# Patient Record
Sex: FEMALE | Race: WHITE | NOT HISPANIC OR LATINO
[De-identification: names, ages, dates, MRNs, and addresses within clinical notes are randomized per-mention and may not be internally consistent; named-entity substitution may affect disease eponyms.]

---

## 2017-02-15 ENCOUNTER — APPOINTMENT (OUTPATIENT)
Dept: ENDOCRINOLOGY | Facility: CLINIC | Age: 62
End: 2017-02-15

## 2019-01-30 ENCOUNTER — APPOINTMENT (OUTPATIENT)
Dept: OTOLARYNGOLOGY | Facility: CLINIC | Age: 64
End: 2019-01-30
Payer: COMMERCIAL

## 2019-01-30 VITALS
DIASTOLIC BLOOD PRESSURE: 84 MMHG | HEIGHT: 62 IN | BODY MASS INDEX: 25.76 KG/M2 | SYSTOLIC BLOOD PRESSURE: 151 MMHG | HEART RATE: 103 BPM | OXYGEN SATURATION: 98 % | WEIGHT: 140 LBS

## 2019-01-30 DIAGNOSIS — Z87.09 PERSONAL HISTORY OF OTHER DISEASES OF THE RESPIRATORY SYSTEM: ICD-10-CM

## 2019-01-30 PROCEDURE — 31231 NASAL ENDOSCOPY DX: CPT

## 2019-01-30 PROCEDURE — 99202 OFFICE O/P NEW SF 15 MIN: CPT | Mod: 25

## 2019-01-30 NOTE — END OF VISIT
[FreeTextEntry3] : I personally discussed this patient with the PA at the time of the visit. I agree with the assessment and plan as written, unless noted below. \par I was present with the PA during the key portions of the history and exam. I agree with the findings and plan as documented in the PA's note, unless noted below\par  [FreeTextEntry2] : I was present during the entire procedure and assisted the PA where needed.\par

## 2019-01-30 NOTE — PROCEDURE
[Topical Lidocaine] : topical lidocaine [Oxymetazoline HCl] : oxymetazoline HCl [de-identified] : Nasal cavity is inflamed and swelling that the scope cannot pass through nasopharynx. Inflamed inferior and middle turbinates. Purulent discharge noted in both nasal cavity.\par

## 2019-01-30 NOTE — HISTORY OF PRESENT ILLNESS
[de-identified] : 64 Y/O F presents at the office for cold-like symptoms for 3 weeks without improvement. Patient reports nasal congestion, cough, sore throat, hoarseness. Patient finished  Augmentin for 10 days but the symptoms persist. She denies headache, fever/chills, purulent discharge, rhinorrhea, epistaxis, muscle ache.

## 2019-01-30 NOTE — ASSESSMENT
[FreeTextEntry1] : 62 Y/O F presents at the office for cold-like symptoms for 3 weeks without improvement. Patient reports nasal congestion, cough, sore throat, hoarseness. Patient finished  Augmentin for 10 days but the symptoms persist. She denies headache, fever/chills, purulent discharge, rhinorrhea, epistaxis, muscle ache.\par \par Possible cold-like symptoms complicated by bacterial sinus infection\par \par - Prescribed Bactrim DS BID for 10 days\par - Prescribed Medrol dose cyrus\par - Recommend nasal saline spray and Afrin spray\par - Recommedn Nexium 20 mg\par - RTC on 2/4/19\par - Advised patient to call the office or go to ED if symptoms get worse\par - Patient verbalized understanding of plans above\par \par

## 2019-02-07 ENCOUNTER — APPOINTMENT (OUTPATIENT)
Dept: OTOLARYNGOLOGY | Facility: HOSPITAL | Age: 64
End: 2019-02-07

## 2019-02-07 ENCOUNTER — TRANSCRIPTION ENCOUNTER (OUTPATIENT)
Age: 64
End: 2019-02-07

## 2019-02-07 VITALS
WEIGHT: 140 LBS | SYSTOLIC BLOOD PRESSURE: 141 MMHG | OXYGEN SATURATION: 98 % | HEIGHT: 62 IN | DIASTOLIC BLOOD PRESSURE: 88 MMHG | BODY MASS INDEX: 25.76 KG/M2 | HEART RATE: 106 BPM

## 2019-11-20 ENCOUNTER — APPOINTMENT (OUTPATIENT)
Dept: OTOLARYNGOLOGY | Facility: CLINIC | Age: 64
End: 2019-11-20
Payer: COMMERCIAL

## 2019-11-20 VITALS
SYSTOLIC BLOOD PRESSURE: 176 MMHG | OXYGEN SATURATION: 98 % | HEART RATE: 85 BPM | DIASTOLIC BLOOD PRESSURE: 94 MMHG | TEMPERATURE: 98.3 F

## 2019-11-20 DIAGNOSIS — R51 HEADACHE: ICD-10-CM

## 2019-11-20 PROCEDURE — 99213 OFFICE O/P EST LOW 20 MIN: CPT | Mod: 25

## 2019-11-20 PROCEDURE — 31231 NASAL ENDOSCOPY DX: CPT

## 2019-11-20 NOTE — REVIEW OF SYSTEMS
[As Noted in HPI] : as noted in HPI [Negative] : Eyes [Nose Bleeds] : no nose bleeds [Nasal Congestion] : no nasal congestion [Discolored Nasal Discharge] : no discolored nasal discharge [FreeTextEntry4] : right temporal headache

## 2019-11-20 NOTE — HISTORY OF PRESENT ILLNESS
[de-identified] : 64F w/ PMH of sinusitis treated with antibiotics, presenting now with un-relenting right sided headache for 5 days. Patient reports that Saturday (5 days ago) she developed right temporal headache that is slightly improved with Tylenol and ibuprofen and mild to moderate in nature, but persistent since Saturday. She denies any fevers, chills, vision changes, weakness, nasal discharge, nasal congestion.

## 2019-11-20 NOTE — ASSESSMENT
[FreeTextEntry1] : 64F w/ PMH of sinusitis, who presents with temporal headaches; likely not sinusitis as PE is not consistent with sinusitis, r/o other sources.\par \par Plan:\par - patient planning to f/u with her PCP for this tomorrow\par - recommended MRI O/F/N and imaging of brain to look for other causes of headache (i.e. temporal arteritis)\par - no need for abx at this time\par - f/u after MRI

## 2019-11-20 NOTE — PHYSICAL EXAM
[Normal] : external appearance is normal [] : septum deviated to the right [Nasal Endoscopy Performed] : nasal endoscopy was performed, see procedure section for findings [de-identified] : septal spur no right and turbinates with erythema, but no obstruction or purulent sinus drainage

## 2019-12-03 ENCOUNTER — OTHER (OUTPATIENT)
Age: 64
End: 2019-12-03

## 2019-12-03 ENCOUNTER — TRANSCRIPTION ENCOUNTER (OUTPATIENT)
Age: 64
End: 2019-12-03

## 2019-12-04 ENCOUNTER — OTHER (OUTPATIENT)
Age: 64
End: 2019-12-04

## 2020-09-09 ENCOUNTER — NON-APPOINTMENT (OUTPATIENT)
Age: 65
End: 2020-09-09

## 2020-09-09 ENCOUNTER — APPOINTMENT (OUTPATIENT)
Dept: HEART AND VASCULAR | Facility: CLINIC | Age: 65
End: 2020-09-09
Payer: COMMERCIAL

## 2020-09-09 VITALS
TEMPERATURE: 98.4 F | DIASTOLIC BLOOD PRESSURE: 95 MMHG | RESPIRATION RATE: 16 BRPM | HEIGHT: 62.5 IN | OXYGEN SATURATION: 100 % | HEART RATE: 95 BPM | BODY MASS INDEX: 26.02 KG/M2 | SYSTOLIC BLOOD PRESSURE: 158 MMHG | WEIGHT: 145 LBS

## 2020-09-09 DIAGNOSIS — R07.9 CHEST PAIN, UNSPECIFIED: ICD-10-CM

## 2020-09-09 PROCEDURE — 93000 ELECTROCARDIOGRAM COMPLETE: CPT

## 2020-09-09 PROCEDURE — 99204 OFFICE O/P NEW MOD 45 MIN: CPT

## 2020-09-09 RX ORDER — ESOMEPRAZOLE MAGNESIUM 20 MG/1
20 CAPSULE, DELAYED RELEASE ORAL DAILY
Qty: 1 | Refills: 0 | Status: DISCONTINUED | COMMUNITY
Start: 2019-01-30 | End: 2020-09-09

## 2020-09-09 NOTE — HISTORY OF PRESENT ILLNESS
[FreeTextEntry1] : 64 yo F with PMH of HTN, pre-DM, HLD, anxiety, h/o spinal fusion surgery, thyroid nodules s/p FNA w/o evidence of malignancy, presents for evaluation of cardiovascular risk.\par \par Recent labwork from 02/2019 showed:\par cholesterol 202\par LDL 97\par HDL 64

## 2020-09-15 PROBLEM — R07.9 CHEST PAIN: Status: ACTIVE | Noted: 2020-09-15

## 2020-11-21 ENCOUNTER — OUTPATIENT (OUTPATIENT)
Dept: OUTPATIENT SERVICES | Facility: HOSPITAL | Age: 65
LOS: 1 days | End: 2020-11-21

## 2020-12-21 PROBLEM — Z87.09 HISTORY OF ACUTE SINUSITIS: Status: RESOLVED | Noted: 2019-01-30 | Resolved: 2020-12-21

## 2020-12-22 ENCOUNTER — APPOINTMENT (OUTPATIENT)
Dept: CARDIOLOGY | Facility: CLINIC | Age: 65
End: 2020-12-22
Payer: COMMERCIAL

## 2020-12-22 ENCOUNTER — NON-APPOINTMENT (OUTPATIENT)
Age: 65
End: 2020-12-22

## 2020-12-22 VITALS
HEIGHT: 63.5 IN | OXYGEN SATURATION: 98 % | SYSTOLIC BLOOD PRESSURE: 150 MMHG | DIASTOLIC BLOOD PRESSURE: 96 MMHG | WEIGHT: 146 LBS | HEART RATE: 114 BPM | BODY MASS INDEX: 25.55 KG/M2

## 2020-12-22 DIAGNOSIS — Z00.00 ENCOUNTER FOR GENERAL ADULT MEDICAL EXAMINATION W/OUT ABNORMAL FINDINGS: ICD-10-CM

## 2020-12-22 PROCEDURE — 93000 ELECTROCARDIOGRAM COMPLETE: CPT

## 2020-12-22 PROCEDURE — 99214 OFFICE O/P EST MOD 30 MIN: CPT

## 2020-12-22 PROCEDURE — 99072 ADDL SUPL MATRL&STAF TM PHE: CPT

## 2020-12-22 RX ORDER — TRIAMCINOLONE ACETONIDE 55 UG/1
55 SPRAY, METERED NASAL
Qty: 1 | Refills: 2 | Status: DISCONTINUED | COMMUNITY
Start: 2019-11-20 | End: 2020-12-22

## 2020-12-22 RX ORDER — AMLODIPINE BESYLATE 2.5 MG/1
2.5 TABLET ORAL DAILY
Qty: 90 | Refills: 0 | Status: DISCONTINUED | COMMUNITY
Start: 2020-12-22 | End: 2020-12-22

## 2020-12-22 RX ORDER — SULFAMETHOXAZOLE AND TRIMETHOPRIM 800; 160 MG/1; MG/1
800-160 TABLET ORAL TWICE DAILY
Qty: 20 | Refills: 0 | Status: DISCONTINUED | COMMUNITY
Start: 2019-01-30 | End: 2020-12-22

## 2020-12-22 RX ORDER — METHYLPREDNISOLONE 4 MG/1
4 TABLET ORAL
Qty: 1 | Refills: 0 | Status: DISCONTINUED | COMMUNITY
Start: 2019-01-30 | End: 2020-12-22

## 2020-12-22 NOTE — DISCUSSION/SUMMARY
[FreeTextEntry1] : 1) D/c Norvasc\par 2) Toprol 25mg QD, 50mg if necessary\par 3) F/u in 3 months, sooner if needed

## 2020-12-22 NOTE — PHYSICAL EXAM
[General Appearance - Well Developed] : well developed [Normal Appearance] : normal appearance [Well Groomed] : well groomed [General Appearance - Well Nourished] : well nourished [No Deformities] : no deformities [General Appearance - In No Acute Distress] : no acute distress [Normal Conjunctiva] : the conjunctiva exhibited no abnormalities [Eyelids - No Xanthelasma] : the eyelids demonstrated no xanthelasmas [Normal Oral Mucosa] : normal oral mucosa [No Oral Pallor] : no oral pallor [No Oral Cyanosis] : no oral cyanosis [Normal Jugular Venous A Waves Present] : normal jugular venous A waves present [Normal Jugular Venous V Waves Present] : normal jugular venous V waves present [No Jugular Venous Chase A Waves] : no jugular venous chase A waves [Heart Rate And Rhythm] : heart rate and rhythm were normal [Heart Sounds] : normal S1 and S2 [Murmurs] : no murmurs present [Respiration, Rhythm And Depth] : normal respiratory rhythm and effort [Exaggerated Use Of Accessory Muscles For Inspiration] : no accessory muscle use [Auscultation Breath Sounds / Voice Sounds] : lungs were clear to auscultation bilaterally [Abdomen Soft] : soft [Abdomen Tenderness] : non-tender [Abdomen Mass (___ Cm)] : no abdominal mass palpated [Abnormal Walk] : normal gait [Gait - Sufficient For Exercise Testing] : the gait was sufficient for exercise testing [Nail Clubbing] : no clubbing of the fingernails [Cyanosis, Localized] : no localized cyanosis [Petechial Hemorrhages (___cm)] : no petechial hemorrhages [Skin Color & Pigmentation] : normal skin color and pigmentation [] : no rash [No Venous Stasis] : no venous stasis [Skin Lesions] : no skin lesions [No Skin Ulcers] : no skin ulcer [No Xanthoma] : no  xanthoma was observed [Oriented To Time, Place, And Person] : oriented to person, place, and time [Affect] : the affect was normal [Mood] : the mood was normal [No Anxiety] : not feeling anxious

## 2020-12-22 NOTE — REASON FOR VISIT
[Consultation] : a consultation regarding [Hypertension] : hypertension [FreeTextEntry1] : I saw this 65-year-old woman in cardiac consultation on 12/22/20\par She is under  stress with her  undergoing treatment for cancer\par Other than hypertension she has no other risk factors for coronary artery disease.  Her lipid profile is reasonable.\par She uses Xanax as needed\par She denies chest pain, shortness of breath, or syncope.

## 2021-02-11 ENCOUNTER — APPOINTMENT (OUTPATIENT)
Dept: CARDIOLOGY | Facility: CLINIC | Age: 66
End: 2021-02-11

## 2021-05-07 ENCOUNTER — APPOINTMENT (OUTPATIENT)
Dept: OPHTHALMOLOGY | Facility: CLINIC | Age: 66
End: 2021-05-07
Payer: COMMERCIAL

## 2021-05-07 ENCOUNTER — NON-APPOINTMENT (OUTPATIENT)
Age: 66
End: 2021-05-07

## 2021-05-07 PROCEDURE — 92002 INTRM OPH EXAM NEW PATIENT: CPT

## 2021-05-07 PROCEDURE — 92134 CPTRZ OPH DX IMG PST SGM RTA: CPT

## 2021-05-07 PROCEDURE — 99072 ADDL SUPL MATRL&STAF TM PHE: CPT

## 2021-05-07 PROCEDURE — 92136 OPHTHALMIC BIOMETRY: CPT

## 2021-11-30 ENCOUNTER — APPOINTMENT (OUTPATIENT)
Dept: OPHTHALMOLOGY | Facility: CLINIC | Age: 66
End: 2021-11-30

## 2022-02-14 RX ORDER — METOPROLOL SUCCINATE 25 MG/1
25 TABLET, EXTENDED RELEASE ORAL
Qty: 180 | Refills: 0 | Status: ACTIVE | COMMUNITY
Start: 2020-12-22 | End: 1900-01-01

## 2022-02-24 ENCOUNTER — APPOINTMENT (OUTPATIENT)
Dept: OPHTHALMOLOGY | Facility: CLINIC | Age: 67
End: 2022-02-24
Payer: COMMERCIAL

## 2022-02-24 ENCOUNTER — NON-APPOINTMENT (OUTPATIENT)
Age: 67
End: 2022-02-24

## 2022-02-24 PROCEDURE — 92012 INTRM OPH EXAM EST PATIENT: CPT

## 2022-05-16 ENCOUNTER — RX RENEWAL (OUTPATIENT)
Age: 67
End: 2022-05-16

## 2022-05-18 ENCOUNTER — APPOINTMENT (OUTPATIENT)
Dept: OPHTHALMOLOGY | Facility: AMBULATORY SURGERY CENTER | Age: 67
End: 2022-05-18

## 2022-08-02 ENCOUNTER — EMERGENCY (EMERGENCY)
Facility: HOSPITAL | Age: 67
LOS: 1 days | Discharge: HOME | End: 2022-08-02
Admitting: EMERGENCY MEDICINE
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
## 2022-08-02 DIAGNOSIS — Z90.89 ACQUIRED ABSENCE OF OTHER ORGANS: Chronic | ICD-10-CM
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
## 2022-08-02 DIAGNOSIS — S61.210A LACERATION WITHOUT FOREIGN BODY OF RIGHT INDEX FINGER WITHOUT DAMAGE TO NAIL, INITIAL ENCOUNTER: ICD-10-CM

## 2022-08-02 DIAGNOSIS — Z98.890 OTHER SPECIFIED POSTPROCEDURAL STATES: Chronic | ICD-10-CM

## 2022-08-02 PROBLEM — I10 ESSENTIAL (PRIMARY) HYPERTENSION: Chronic | Status: ACTIVE | Noted: 2022-05-17

## 2022-08-02 PROCEDURE — 99283 EMERGENCY DEPT VISIT LOW MDM: CPT | Mod: 25

## 2022-08-02 PROCEDURE — 73130 X-RAY EXAM OF HAND: CPT | Mod: 26,RT

## 2022-08-02 PROCEDURE — 12001 RPR S/N/AX/GEN/TRNK 2.5CM/<: CPT

## 2022-09-28 ENCOUNTER — APPOINTMENT (OUTPATIENT)
Dept: PULMONOLOGY | Facility: CLINIC | Age: 67
End: 2022-09-28

## 2022-09-28 VITALS
SYSTOLIC BLOOD PRESSURE: 183 MMHG | HEART RATE: 80 BPM | OXYGEN SATURATION: 99 % | DIASTOLIC BLOOD PRESSURE: 100 MMHG | HEIGHT: 63.5 IN | TEMPERATURE: 97.3 F | RESPIRATION RATE: 12 BRPM | WEIGHT: 137 LBS | BODY MASS INDEX: 23.97 KG/M2

## 2022-09-28 VITALS — SYSTOLIC BLOOD PRESSURE: 162 MMHG | DIASTOLIC BLOOD PRESSURE: 78 MMHG

## 2022-09-28 DIAGNOSIS — K21.9 GASTRO-ESOPHAGEAL REFLUX DISEASE W/OUT ESOPHAGITIS: ICD-10-CM

## 2022-09-28 DIAGNOSIS — R05.9 COUGH, UNSPECIFIED: ICD-10-CM

## 2022-09-28 DIAGNOSIS — J30.9 ALLERGIC RHINITIS, UNSPECIFIED: ICD-10-CM

## 2022-09-28 PROCEDURE — 99204 OFFICE O/P NEW MOD 45 MIN: CPT

## 2022-09-28 NOTE — DISCUSSION/SUMMARY
[FreeTextEntry1] : 67 year old female, former smoker (quit smoking 40 years back) came to clinic for dry night time cough for last 6 weeks. She is c/o nasal congestion and symptoms of GERD. She used omeprazole for GERD but felt tired and d/c it. Plan to start her on azelastine and Flonase nasal spray. Also to add famotidine 40 mg bid (20 mg bid by ineffective in the past).

## 2022-09-28 NOTE — HISTORY OF PRESENT ILLNESS
[Former] : former [TextBox_4] : 67 year old female, former smoker (quit smoking 40 years back) came to clinic for cough for last 6 weeks. Patient continued to have dry cough, which is persistent without any improvement. Cough is present mostly at night time, predominantly dry, associated with nasal congestion, constant throat clearing and GERD symptoms. She denies fever, chest pain or sore throat. No exposure at home, work identified. No pets a home. No h/o previous episodes of cough.\par  [ESS] : 2

## 2022-09-28 NOTE — REVIEW OF SYSTEMS
[Fever] : no fever [Chills] : no chills [Nasal Congestion] : nasal congestion [Postnasal Drip] : postnasal drip [Cough] : cough [Sputum] : no sputum [Dyspnea] : no dyspnea [SOB on Exertion] : no sob on exertion [Chest Discomfort] : no chest discomfort [Orthopnea] : no orthopnea [Nasal Discharge] : nasal discharge [GERD] : gerd [Nocturia] : no nocturia [Irregular Menses] : no irregular menses [Raynaud] : no raynaud [Telangiectasias] : no telangiectasias [Anemia] : no anemia [History of Iron Deficiency] : no history of iron deficiency [Headache] : no headache [Dizziness] : no dizziness

## 2022-09-28 NOTE — PHYSICAL EXAM
[No Acute Distress] : no acute distress [Well Nourished] : well nourished [Normal Oropharynx] : normal oropharynx [II] : Mallampati Class: II [Normal Appearance] : normal appearance [Normal Rate/Rhythm] : normal rate/rhythm [Normal S1, S2] : normal s1, s2 [No Resp Distress] : no resp distress [Clear to Auscultation Bilaterally] : clear to auscultation bilaterally [Benign] : benign [Not Tender] : not tender [Normal Gait] : normal gait [No Clubbing] : no clubbing [No Edema] : no edema [Normal Color/ Pigmentation] : normal color/ pigmentation [No Rash] : no rash [No Focal Deficits] : no focal deficits [No Sensory Deficits] : no sensory deficits [Oriented x3] : oriented x3 [Normal Mood] : normal mood

## 2022-10-03 RX ORDER — AZELASTINE HYDROCHLORIDE 137 UG/1
137 SPRAY, METERED NASAL
Qty: 1 | Refills: 5 | Status: ACTIVE | COMMUNITY
Start: 2022-09-28 | End: 1900-01-01

## 2022-10-03 RX ORDER — FLUTICASONE PROPIONATE 50 UG/1
50 SPRAY, METERED NASAL TWICE DAILY
Qty: 1 | Refills: 5 | Status: ACTIVE | COMMUNITY
Start: 2022-09-28 | End: 1900-01-01

## 2022-10-14 NOTE — ASU PATIENT PROFILE, ADULT - FALL HARM RISK - UNIVERSAL INTERVENTIONS
Bed in lowest position, wheels locked, appropriate side rails in place/Call bell, personal items and telephone in reach/Instruct patient to call for assistance before getting out of bed or chair/Non-slip footwear when patient is out of bed/[place] to call system/Physically safe environment - no spills, clutter or unnecessary equipment/Purposeful Proactive Rounding/Room/bathroom lighting operational, light cord in reach

## 2022-10-17 ENCOUNTER — OUTPATIENT (OUTPATIENT)
Dept: OUTPATIENT SERVICES | Facility: HOSPITAL | Age: 67
LOS: 1 days | Discharge: HOME | End: 2022-10-17

## 2022-10-17 ENCOUNTER — TRANSCRIPTION ENCOUNTER (OUTPATIENT)
Age: 67
End: 2022-10-17

## 2022-10-17 VITALS
HEART RATE: 70 BPM | OXYGEN SATURATION: 99 % | SYSTOLIC BLOOD PRESSURE: 112 MMHG | RESPIRATION RATE: 16 BRPM | TEMPERATURE: 98 F | DIASTOLIC BLOOD PRESSURE: 68 MMHG

## 2022-10-17 VITALS
DIASTOLIC BLOOD PRESSURE: 70 MMHG | RESPIRATION RATE: 14 BRPM | WEIGHT: 138.23 LBS | SYSTOLIC BLOOD PRESSURE: 114 MMHG | HEART RATE: 69 BPM | TEMPERATURE: 98 F | HEIGHT: 63 IN | OXYGEN SATURATION: 100 %

## 2022-10-17 DIAGNOSIS — Z90.89 ACQUIRED ABSENCE OF OTHER ORGANS: Chronic | ICD-10-CM

## 2022-10-17 DIAGNOSIS — Z98.890 OTHER SPECIFIED POSTPROCEDURAL STATES: Chronic | ICD-10-CM

## 2022-10-17 DEVICE — LENS IOL TECNIS PROTEC ZCB00 23.0D
Type: IMPLANTABLE DEVICE | Site: RIGHT | Status: NON-FUNCTIONAL
Removed: 2022-10-17

## 2022-10-17 RX ORDER — SODIUM CHLORIDE 9 MG/ML
1000 INJECTION, SOLUTION INTRAVENOUS
Refills: 0 | Status: DISCONTINUED | OUTPATIENT
Start: 2022-10-17 | End: 2022-10-17

## 2022-10-17 RX ORDER — PHENYLEPHRINE HCL 2.5 %
1 DROPS OPHTHALMIC (EYE)
Refills: 0 | Status: COMPLETED | OUTPATIENT
Start: 2022-10-17 | End: 2022-10-17

## 2022-10-17 RX ORDER — TROPICAMIDE 1 %
1 DROPS OPHTHALMIC (EYE)
Refills: 0 | Status: COMPLETED | OUTPATIENT
Start: 2022-10-17 | End: 2022-10-17

## 2022-10-17 RX ORDER — ONDANSETRON 8 MG/1
4 TABLET, FILM COATED ORAL EVERY 4 HOURS
Refills: 0 | Status: DISCONTINUED | OUTPATIENT
Start: 2022-10-17 | End: 2022-10-17

## 2022-10-17 RX ORDER — ACETAMINOPHEN 500 MG
650 TABLET ORAL EVERY 6 HOURS
Refills: 0 | Status: DISCONTINUED | OUTPATIENT
Start: 2022-10-17 | End: 2022-10-17

## 2022-10-17 RX ORDER — KETOROLAC TROMETHAMINE 30 MG/ML
15 SYRINGE (ML) INJECTION ONCE
Refills: 0 | Status: DISCONTINUED | OUTPATIENT
Start: 2022-10-17 | End: 2022-10-17

## 2022-10-17 RX ORDER — OXYCODONE HYDROCHLORIDE 5 MG/1
5 TABLET ORAL ONCE
Refills: 0 | Status: DISCONTINUED | OUTPATIENT
Start: 2022-10-17 | End: 2022-10-17

## 2022-10-17 RX ORDER — KETOROLAC TROMETHAMINE 0.5 %
1 DROPS OPHTHALMIC (EYE)
Refills: 0 | Status: COMPLETED | OUTPATIENT
Start: 2022-10-17 | End: 2022-10-17

## 2022-10-17 RX ADMIN — Medication 1 DROP(S): at 08:12

## 2022-10-17 RX ADMIN — Medication 1 DROP(S): at 08:07

## 2022-10-17 RX ADMIN — Medication 1 DROP(S): at 08:17

## 2022-10-17 NOTE — OPERATIVE REPORT - OPERATIVE RPOSRT DETAILS
DATE OF SURGERY: 10-    SURGEON NAME:  LISSETTE KERR MD    ANESTHESIA: MAC    OPERATION: Cataract Extraction with Femtosecond Laser & IOL Implantation    INTRAOCULAR LENS:  CZB00 23.0    PREOPERATIVE DIAGNOSIS: Cataract & Astigmatism    POSTOPERATIVE DIAGNOSIS: Same    COMPLICATIONS:	None    ESTIMATED BLOOD LOSS: <1 cc	    PROCEDURE:    The patient was brought to the Femtosecond laser suite at [facility] operating room.  The patient’s right eye was anesthetized with Tetracaine drops.  A Methylene blue marking pen was used to make alignment marks at the limbus at the 3 and 9 o’clock positions.  Next, programming of the laser was reviewed including the program for creation of corneal relaxing incisions for the correction of congenital astigmatism, nuclear lens softening, and capsulotomy.  The patient was then placed in a supine position.  The suction ring for the laser was placed onto the eye.   The fiducial marks on the suction ring were aligned with the Methylene blue marks that had been previously placed on the cornea.  Suction was applied and the suction ring was docked to the laser.   OCT scanning took place and all relevant ocular surfaces were identified.  Next, the pre-programmed treatments for the creation of the corneal relaxing incisions, nuclear lens softening, and capsulotomy were performed.   All pre-programmed treatments were successfully completed.   Next, the suction ring was removed.   The patient was taken to the main operating room.    The right  eye was prepped and draped in the usual fashion.  A wire lid speculum was inserted.  Additional numbing took place with the installation of more Tetracaine.  A Meño scissor was used to open the conjunctiva and Tenon’s in the inferotemporal quadrant.  Next, an anesthetic mixture consisting of 50% 2% Lidocaine, 50% 0.75% Marcaine, and an ampule of Wydase was injected using a BSS cannula.  A total of 3 cc was injected below Tenon’s capsule.  Next, a superior rectus traction suture was placed.  A fornix based conjunctival flap was raised using a Meño scissor.  Hemostasis was achieved using a wet field cautery.   Next, a 69 [place] blade was used to make a vertical incision of about 50% depth, 3 mm length, at 0.5 mm posterior to the limbus.  A Yale blade was then used to dissect a scleral tunnel to the arcades.  Next, a 15-degree superblade was used to create incisions at 10 and 2 o’clock positions.      Following this, a 2.75 mm keratome was used to open the main wound.   Next, MPF Lidocaine was injected into the anterior chamber followed by Epinephrine.  Next, Vision blue was injected into the anterior chamber and irrigated out after a period of 20 seconds.  The anterior chamber was then filled with Amvisc plus.  Next, a capsulorexis forcep was used to remove the anterior lens capsule.  Hydro dissection was then performed.  The lens nucleus was prolapsed into the anterior chamber.  The nucleus was then removed with the phaco tip of the GERSON Signature machine.  The cortex was removed with the Transformer IA tip.  The posterior capsule was polished with a Ya scratcher and the sub-incisional cortex was removed using a bimanual technique employing a Simcoe cannula.      The eye was then filled with Healon.  A posterior chamber intraocular lens was inserted into the capsular bag. The Healon was removed using the Transformer IA tip.  Miochol was injected to constrict the pupil.  The wound was closed superiorly using two interrupted 10-O nylon sutures with the knots buried into the cornea.  The side port incisions were infiltrated with BSS on an air cannula.  Next, injections of Ancef and Solumedrol were delivered sub-conjunctivally.  The traction suture was then cut and removed.  The speculum was removed.   Pilocarpine drops were then instilled into the eye.  The lid was closed.  Ilotycin ointment was applied to the lids.  A patch and shield were then applied.  The patient was returned to post-op holding area in good condition having tolerated the procedure well.

## 2022-10-17 NOTE — ASU DISCHARGE PLAN (ADULT/PEDIATRIC) - NS MD DC FALL RISK RISK
For information on Fall & Injury Prevention, visit: [email or web address] OR  [email or web address] OR  https://www.cdc.gov/steadi/patient.html

## 2022-10-25 RX ORDER — KETOROLAC TROMETHAMINE 0.5 %
1 DROPS OPHTHALMIC (EYE)
Refills: 0 | Status: DISCONTINUED | OUTPATIENT
Start: 2022-10-31 | End: 2022-10-31

## 2022-10-25 RX ORDER — TROPICAMIDE 1 %
1 DROPS OPHTHALMIC (EYE)
Refills: 0 | Status: DISCONTINUED | OUTPATIENT
Start: 2022-10-31 | End: 2022-10-31

## 2022-10-25 RX ORDER — PHENYLEPHRINE HCL 2.5 %
1 DROPS OPHTHALMIC (EYE)
Refills: 0 | Status: DISCONTINUED | OUTPATIENT
Start: 2022-10-31 | End: 2022-10-31

## 2022-10-25 RX ORDER — SODIUM CHLORIDE 9 MG/ML
1000 INJECTION, SOLUTION INTRAVENOUS
Refills: 0 | Status: DISCONTINUED | OUTPATIENT
Start: 2022-10-31 | End: 2022-10-31

## 2022-10-28 NOTE — ASU PATIENT PROFILE, ADULT - NSICDXPASTSURGICALHX_GEN_ALL_CORE_FT
PAST SURGICAL HISTORY:  S/P lumbar spine operation     Status post extracapsular cataract extraction of right eye     Status post tonsillectomy

## 2022-10-31 ENCOUNTER — TRANSCRIPTION ENCOUNTER (OUTPATIENT)
Age: 67
End: 2022-10-31

## 2022-10-31 ENCOUNTER — OUTPATIENT (OUTPATIENT)
Dept: OUTPATIENT SERVICES | Facility: HOSPITAL | Age: 67
LOS: 1 days | Discharge: HOME | End: 2022-10-31

## 2022-10-31 VITALS
RESPIRATION RATE: 16 BRPM | OXYGEN SATURATION: 99 % | WEIGHT: 137.79 LBS | SYSTOLIC BLOOD PRESSURE: 132 MMHG | TEMPERATURE: 98 F | HEIGHT: 63 IN | HEART RATE: 73 BPM | DIASTOLIC BLOOD PRESSURE: 90 MMHG

## 2022-10-31 VITALS
SYSTOLIC BLOOD PRESSURE: 137 MMHG | OXYGEN SATURATION: 98 % | TEMPERATURE: 98 F | HEART RATE: 60 BPM | DIASTOLIC BLOOD PRESSURE: 75 MMHG | RESPIRATION RATE: 15 BRPM

## 2022-10-31 DIAGNOSIS — Z90.89 ACQUIRED ABSENCE OF OTHER ORGANS: Chronic | ICD-10-CM

## 2022-10-31 DIAGNOSIS — Z98.890 OTHER SPECIFIED POSTPROCEDURAL STATES: Chronic | ICD-10-CM

## 2022-10-31 DIAGNOSIS — Z98.41 CATARACT EXTRACTION STATUS, RIGHT EYE: Chronic | ICD-10-CM

## 2022-10-31 DEVICE — LENS IOL TECNIS PROTEC ZCB00 22.0D
Type: IMPLANTABLE DEVICE | Site: LEFT (LEFT EYE) | Status: NON-FUNCTIONAL
Removed: 2022-10-31

## 2022-10-31 NOTE — OPERATIVE REPORT - OPERATIVE RPOSRT DETAILS
DATE OF SURGERY:10-    SURGEON NAME:  LISSETTE KERR MD    ANESTHESIA: MAC    OPERATION: Cataract Extraction with Femtosecond Laser & IOL Implantation    INTRAOCULAR LENS: CZB00 22.0D    PREOPERATIVE DIAGNOSIS: Cataract & Astigmatism left eye    POSTOPERATIVE DIAGNOSIS: Same    COMPLICATIONS:	None    ESTIMATED BLOOD LOSS: <1 cc	    PROCEDURE:    The patient was brought to the Femtosecond laser suite at [facility] operating room.  The patient’s left eye was anesthetized with Tetracaine drops.  A Methylene blue marking pen was used to make alignment marks at the limbus at the 3 and 9 o’clock positions.  Next, programming of the laser was reviewed including the program for creation of corneal relaxing incisions for the correction of congenital astigmatism, nuclear lens softening, and capsulotomy.  The patient was then placed in a supine position.  The suction ring for the laser was placed onto the eye.   The fiducial marks on the suction ring were aligned with the Methylene blue marks that had been previously placed on the cornea.  Suction was applied and the suction ring was docked to the laser.   OCT scanning took place and all relevant ocular surfaces were identified.  Next, the pre-programmed treatments for the creation of the corneal relaxing incisions, nuclear lens softening, and capsulotomy were performed.   All pre-programmed treatments were successfully completed.   Next, the suction ring was removed.   The patient was taken to the main operating room.    The left eye was prepped and draped in the usual fashion.  A wire lid speculum was inserted.  Additional numbing took place with the installation of more Tetracaine.   Next, a superior rectus traction suture was placed.  A fornix based conjunctival flap was raised using a Meño scissor.  Hemostasis was achieved using a wet field cautery.   Next, a 69 [place] blade was used to make a vertical incision of about 50% depth, 3 mm length, at 0.5 mm posterior to the limbus.  A [place] blade was then used to dissect a scleral tunnel to the arcades.  Next, a 15-degree superblade was used to create incisions at 10 and 2 o’clock positions.      Following this, a 2.75 mm keratome was used to open the main wound.   Next, MPF Lidocaine was injected into the anterior chamber followed by Epinephrine.  Next, Vision blue was injected into the anterior chamber and irrigated out after a period of 20 seconds.  The anterior chamber was then filled with Amvisc plus.  Next, a capsulorexis forcep was used to remove the anterior lens capsule.  Hydro dissection was then performed.  The lens nucleus was prolapsed into the anterior chamber.  The nucleus was then removed with the phaco tip of the GERSON Signature machine.  The cortex was removed with the Transformer IA tip.  The posterior capsule was polished with a Ya scratcher and the sub-incisional cortex was removed using a bimanual technique employing a Simcoe cannula.      The eye was then filled with Healon.  A posterior chamber intraocular lens was inserted.  The Healon was removed using the Transformer IA tip.  Miochol was injected to constrict the pupil.  The wound was closed superiorly using two interrupted 10-O nylon sutures with the knots buried into the cornea.  The side port incisions were infiltrated with BSS on an air cannula.   The traction suture was then cut and removed.  The speculum was removed.   Pilocarpine drops were then instilled into the eye.  A collagen shield soaked in tobradex was placed onto the eye. The lid was closed.  Ilotycin ointment was applied to the lids.  A patch and shield were then applied.  The patient was returned to post-op holding area in good condition having tolerated the procedure well.

## 2022-10-31 NOTE — ASU DISCHARGE PLAN (ADULT/PEDIATRIC) - NS MD DC FALL RISK RISK
For information on Fall & Injury Prevention, visit: [email or web address] OR  [email or web address].[facility]/[email or web address] OR  https://www.cdc.gov/steadi/patient.html

## 2022-11-16 ENCOUNTER — APPOINTMENT (OUTPATIENT)
Dept: PULMONOLOGY | Facility: CLINIC | Age: 67
End: 2022-11-16

## 2023-01-30 RX ORDER — FAMOTIDINE 40 MG/1
40 TABLET, FILM COATED ORAL TWICE DAILY
Qty: 60 | Refills: 1 | Status: ACTIVE | COMMUNITY
Start: 2022-09-28 | End: 1900-01-01

## 2023-08-26 ENCOUNTER — NON-APPOINTMENT (OUTPATIENT)
Age: 68
End: 2023-08-26

## 2024-05-21 ENCOUNTER — APPOINTMENT (OUTPATIENT)
Dept: NEPHROLOGY | Facility: CLINIC | Age: 69
End: 2024-05-21
Payer: COMMERCIAL

## 2024-05-21 VITALS — DIASTOLIC BLOOD PRESSURE: 75 MMHG | SYSTOLIC BLOOD PRESSURE: 117 MMHG | HEART RATE: 84 BPM

## 2024-05-21 DIAGNOSIS — R73.03 PREDIABETES.: ICD-10-CM

## 2024-05-21 DIAGNOSIS — R09.89 OTHER SPECIFIED SYMPTOMS AND SIGNS INVOLVING THE CIRCULATORY AND RESPIRATORY SYSTEMS: ICD-10-CM

## 2024-05-21 PROCEDURE — G2211 COMPLEX E/M VISIT ADD ON: CPT

## 2024-05-21 PROCEDURE — 99204 OFFICE O/P NEW MOD 45 MIN: CPT

## 2024-05-21 NOTE — ASU PATIENT PROFILE, ADULT - IS PATIENT PREGNANT?
Initial CHANDLER/CM Assessment/Plan of Care Note     Baseline Assessment  73 year old admitted 5/21/2024 as Inpatient with a diagnosis of SCT.   Prior to admission patient was living with Spouse and residing at House    .  Patient does  have a Power of  for Healthcare.  Document is not activated.  Agent is spouse Julia.  Patient’s Primary Care Provider is Fidel Martin MD.     Progress Note  SW consult per SCT protocol.  Pt is from home with spouse and was independent prior to admission.  Pt can drive but prefers spouse to drive if available.  Pt spouse will be caregiver after discharge, with son or daughter in law Susi as backup caregiver.  Pt will stay in Transplant housing after discharge.  Pt was in good spirits today and brought laptop and deck of cards to keep busy during hospitalization.    SW will follow weekly per SCT protocol.  Anticipate discharge home once medically ready.    Plan  Patient/Family Discharge Goal: Home   Is patient/family goal achievable: Yes    SW/CM - Recommendations for Discharge: Home     Barriers to Discharge  Identified Barriers to Discharge/Transition Planning:          Anticipate patient will not need post-hospital services. Necessary services are available.  Anticipate patient can return to the environment from which patient entered the hospital.   Anticipate patient can provide self-care at discharge.    Refer to SW/CM Flowsheet for objective data.     Medical History  Past Medical History:   Diagnosis Date    Carotid artery dissection  (CMD)     left    Diverticulosis     ED (erectile dysfunction)     Hemorrhoids     History of colon polyps     Hyperlipidemia     Hypertension     Macular degeneration     \"left is worse than right\"    Peptic ulcer disease     Premature ventricular contraction     Sleep apnea     Umbilical hernia        Prior to Admission Status  Functional Status  Ambulation: Independent/Self  Bathing: Independent/Self  Dressing: Independent/Self  Toileting:  Independent/Self  Meal Preparation: Independent/Self  Shopping: Independent/Self  Medication Preparation: Independent/Self  Medication Administration: Independent/Self  Housekeeping: Independent/Self  Laundry: Independent/Self  Transportation: Independent/Self, Family (Pt can drive but prefers wife to drive)    Agency/Support  Type of Services Prior to Hospitalization: None               Support Systems: Family members   Home Devices/Equipment: None         Mobility Assist Devices: None  Sensory Support Devices: None    Prior Function                Current Function  Last Filed Values       None            Therapy Recommendations for Discharge:   PT:      Last Filed Values       None          OT:       Last Filed Values       None          SLP:    Last Filed Values       None            Insurance  Primary: MEDICARE  Secondary: [employer]    Disposition Recommendations:  CHANDLER/CM recommendation for discharge: Home            no

## 2024-05-26 NOTE — REASON FOR VISIT
Addended by: RUDOLPH MARRERO on: 12/5/2018 03:32 PM     Modules accepted: Orders     [Initial Evaluation] : an initial evaluation [FreeTextEntry1] : accelerated HTN

## 2024-05-26 NOTE — HISTORY OF PRESENT ILLNESS
[FreeTextEntry1] : 68 yo woman here for further evaluation and management of acute rises in BP h/o labile BPs for many years has not needed antihypertensive meds since 2020 very stressful x 11 years-  ill- 2 bouts  of cancer on metoprolol  37.5 mg daily since 2020.  amlodipine added last week 2.5 no NSAIDs regularly  was in ortho office yesterday > 200/100-  repeat at end of MD visit was 166/96; did take 1/2 xanax (.5 mg tab) right there in ortho office and BP was 120/75 when she took once home earlier today, 124/80s in Dr. 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

## 2024-05-26 NOTE — ASSESSMENT
[FreeTextEntry1] : all lab data was reviewed with patient in detail from EHR 70 yo woman with labile HTN BP controlled in office today reviewed home BP monitoring technique: seated position, arm supported on table- 3 measurements 2-5 minutes apart- record lowest BP  will need ABPM  when able to wear-  rule out secondary etiologies for HTN check u/a and UAC will review with  Dr. Carmona  f/u 4-8 weeks

## 2024-05-26 NOTE — CONSULT LETTER
[Dear  ___] : Dear ~ZANE, [Consult Letter:] : I had the pleasure of evaluating your patient, [unfilled]. [Please see my note below.] : Please see my note below. [Consult Closing:] : Thank you very much for allowing me to participate in the care of this patient.  If you have any questions, please do not hesitate to contact me. [FreeTextEntry2] : Vega Carmona MD [address] [address]  [FreeTextEntry1] : Her BP in my office was 116/75 mmHg. I sent her for new labs and she will follow  up with me soon. I will keep you apprised of my findings.  [FreeTextEntry3] : Best personal regards, Bonny Hurtado MD, FACP Professor of Medicine [employer] [facility] [facility] [employer] [facility]

## 2024-05-31 ENCOUNTER — NON-APPOINTMENT (OUTPATIENT)
Age: 69
End: 2024-05-31

## 2024-06-02 LAB
APPEARANCE: CLEAR
BACTERIA: NEGATIVE /HPF
BILIRUBIN URINE: NEGATIVE
BLOOD URINE: NEGATIVE
CAST: 0 /LPF
COLOR: YELLOW
CREAT SPEC-SCNC: 43 MG/DL
CREAT SPEC-SCNC: 43 MG/DL
CREAT/PROT UR: 0.2 RATIO
EPITHELIAL CELLS: 0 /HPF
GLUCOSE QUALITATIVE U: NEGATIVE MG/DL
KETONES URINE: NEGATIVE MG/DL
LEUKOCYTE ESTERASE URINE: NEGATIVE
MICROALBUMIN 24H UR DL<=1MG/L-MCNC: <1.2 MG/DL
MICROALBUMIN/CREAT 24H UR-RTO: NORMAL MG/G
MICROSCOPIC-UA: NORMAL
NITRITE URINE: NEGATIVE
PH URINE: 6.5
PROT UR-MCNC: 7 MG/DL
PROTEIN URINE: NEGATIVE MG/DL
RED BLOOD CELLS URINE: 0 /HPF
SPECIFIC GRAVITY URINE: 1.01
UROBILINOGEN URINE: 0.2 MG/DL
WHITE BLOOD CELLS URINE: 0 /HPF

## 2024-06-05 ENCOUNTER — TRANSCRIPTION ENCOUNTER (OUTPATIENT)
Age: 69
End: 2024-06-05

## 2024-06-05 LAB — ALDOSTERONE SERUM: 9.8 NG/DL

## 2024-06-06 ENCOUNTER — APPOINTMENT (OUTPATIENT)
Dept: NEPHROLOGY | Facility: CLINIC | Age: 69
End: 2024-06-06
Payer: COMMERCIAL

## 2024-06-06 VITALS — DIASTOLIC BLOOD PRESSURE: 77 MMHG | SYSTOLIC BLOOD PRESSURE: 144 MMHG | HEART RATE: 68 BPM

## 2024-06-06 DIAGNOSIS — I10 ESSENTIAL (PRIMARY) HYPERTENSION: ICD-10-CM

## 2024-06-06 PROCEDURE — 93784 AMBL BP MNTR W/SOFTWARE: CPT

## 2024-06-07 ENCOUNTER — NON-APPOINTMENT (OUTPATIENT)
Age: 69
End: 2024-06-07

## 2024-06-07 PROBLEM — I10 BENIGN ESSENTIAL HYPERTENSION: Status: ACTIVE | Noted: 2020-09-09

## 2024-06-07 NOTE — ASSESSMENT
[FreeTextEntry1] : Reviewed ABPM process with patient. Will have on for at least 24 hours including sleep. Device is not to get wet, patient will take off for shower. Will take BP with beep prior ever 20 minutes while awake and every 30 minutes while asleep without beep. After 24 hours the monitor can be turned off and returned to the clinic, after which I or the consulting physician will call with results. Patient expressed understanding and had all questions answered, agreeable to procedure.

## 2024-06-10 LAB — RENIN ACTIVITY, PLASMA: 0.41 NG/ML/HR

## 2024-06-17 LAB
DOPAMINE UR-MCNC: <30 PG/ML
EPINEPH UR-MCNC: 38 PG/ML
NOREPINEPH UR-MCNC: 341 PG/ML

## 2024-06-19 LAB
METANEPHRINE, PL: 28.6 PG/ML
NORMETANEPHRINE, PL: 135.8 PG/ML

## 2024-07-18 ENCOUNTER — TRANSCRIPTION ENCOUNTER (OUTPATIENT)
Age: 69
End: 2024-07-18

## 2024-07-22 ENCOUNTER — TRANSCRIPTION ENCOUNTER (OUTPATIENT)
Age: 69
End: 2024-07-22

## 2024-07-23 ENCOUNTER — APPOINTMENT (OUTPATIENT)
Dept: NEPHROLOGY | Facility: CLINIC | Age: 69
End: 2024-07-23
Payer: COMMERCIAL

## 2024-07-23 DIAGNOSIS — I10 ESSENTIAL (PRIMARY) HYPERTENSION: ICD-10-CM

## 2024-07-23 DIAGNOSIS — R09.89 OTHER SPECIFIED SYMPTOMS AND SIGNS INVOLVING THE CIRCULATORY AND RESPIRATORY SYSTEMS: ICD-10-CM

## 2024-07-23 PROCEDURE — 99442: CPT

## 2024-07-23 NOTE — REASON FOR VISIT
[Home] : at home, [unfilled] , at the time of the visit. [Medical Office: ([facility])___] : at the medical office located in  [Verbal consent obtained from patient] : the patient, [unfilled] [Follow-Up] : a follow-up visit

## 2024-07-24 ENCOUNTER — TRANSCRIPTION ENCOUNTER (OUTPATIENT)
Age: 69
End: 2024-07-24

## 2024-09-06 ENCOUNTER — APPOINTMENT (OUTPATIENT)
Dept: ULTRASOUND IMAGING | Facility: CLINIC | Age: 69
End: 2024-09-06
Payer: COMMERCIAL

## 2024-09-06 PROCEDURE — 76830 TRANSVAGINAL US NON-OB: CPT

## 2024-09-06 PROCEDURE — 76536 US EXAM OF HEAD AND NECK: CPT

## 2024-09-06 PROCEDURE — 76856 US EXAM PELVIC COMPLETE: CPT

## 2024-09-10 ENCOUNTER — APPOINTMENT (OUTPATIENT)
Dept: NEPHROLOGY | Facility: CLINIC | Age: 69
End: 2024-09-10
Payer: COMMERCIAL

## 2024-09-10 VITALS — DIASTOLIC BLOOD PRESSURE: 76 MMHG | SYSTOLIC BLOOD PRESSURE: 136 MMHG

## 2024-09-10 VITALS — DIASTOLIC BLOOD PRESSURE: 74 MMHG | SYSTOLIC BLOOD PRESSURE: 135 MMHG

## 2024-09-10 VITALS — HEART RATE: 68 BPM | SYSTOLIC BLOOD PRESSURE: 140 MMHG | DIASTOLIC BLOOD PRESSURE: 76 MMHG

## 2024-09-10 DIAGNOSIS — R09.89 OTHER SPECIFIED SYMPTOMS AND SIGNS INVOLVING THE CIRCULATORY AND RESPIRATORY SYSTEMS: ICD-10-CM

## 2024-09-10 DIAGNOSIS — F41.9 ANXIETY DISORDER, UNSPECIFIED: ICD-10-CM

## 2024-09-10 DIAGNOSIS — I10 ESSENTIAL (PRIMARY) HYPERTENSION: ICD-10-CM

## 2024-09-10 PROCEDURE — G2211 COMPLEX E/M VISIT ADD ON: CPT | Mod: NC

## 2024-09-10 PROCEDURE — 99213 OFFICE O/P EST LOW 20 MIN: CPT

## 2024-09-10 NOTE — HISTORY OF PRESENT ILLNESS
[FreeTextEntry1] : 70 yo woman with HTN, for follow up evaluation. 6/6/2024 ABPM average /73 daytime 142/79, evening 117/63 52% daytime readings above goal- consider changing timing of meds-  No HA, CP, SOB for R THR in 1/2025- did have injection early summer- helped for a few weeks- now pain back to prior baseline (had L THR in 2/2024) does use NSAIDs prn trying to limit to 1-2 weekly  h/o labile BPs for many years has not needed antihypertensive meds since 2020 contributing to stress is  ill- 2 bouts  of cancer ongoing illness since 2013   IOV on 5/21 after was in ortho office 5/20 BP > 200/100-  repeat at end of MD visit was 166/96; did take 1/2 xanax (.5 mg tab) right there in ortho office and BP was 120/75 when she took once home 5/21 Dr. Carmona office: 124/80s in Dr. 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

## 2024-09-10 NOTE — ASSESSMENT
[FreeTextEntry1] : secondary etiologies of HTN excluded by lab work no albuminuria u/a bland 68 yo woman with labile HTN BP acceptable today- home monitor correlates with office equipment here today no changes to regimen Asking what to do if BP spikes- > 180-- if asymptomatic can wait several hours and repeat if feeling anxious can use her xanax- titrate dosage as she needs- using 1/4-1/2 tab at this time okay to take 1 extra metoprolol as well   f/u 6 months

## 2024-09-24 ENCOUNTER — TRANSCRIPTION ENCOUNTER (OUTPATIENT)
Age: 69
End: 2024-09-24

## 2024-10-31 ENCOUNTER — TRANSCRIPTION ENCOUNTER (OUTPATIENT)
Age: 69
End: 2024-10-31

## 2024-11-05 ENCOUNTER — APPOINTMENT (OUTPATIENT)
Dept: NEPHROLOGY | Facility: CLINIC | Age: 69
End: 2024-11-05
Payer: COMMERCIAL

## 2024-11-05 DIAGNOSIS — R09.89 OTHER SPECIFIED SYMPTOMS AND SIGNS INVOLVING THE CIRCULATORY AND RESPIRATORY SYSTEMS: ICD-10-CM

## 2024-11-05 DIAGNOSIS — F41.9 ANXIETY DISORDER, UNSPECIFIED: ICD-10-CM

## 2024-11-05 DIAGNOSIS — I10 ESSENTIAL (PRIMARY) HYPERTENSION: ICD-10-CM

## 2024-11-05 PROCEDURE — 99443: CPT

## 2024-11-07 ENCOUNTER — INPATIENT (INPATIENT)
Facility: HOSPITAL | Age: 69
LOS: 0 days | Discharge: HOME | End: 2024-11-08
Attending: SPECIALIST | Admitting: SPECIALIST
Payer: COMMERCIAL

## 2024-11-07 VITALS
WEIGHT: 138.01 LBS | SYSTOLIC BLOOD PRESSURE: 140 MMHG | OXYGEN SATURATION: 97 % | HEART RATE: 111 BPM | HEIGHT: 64 IN | DIASTOLIC BLOOD PRESSURE: 102 MMHG | TEMPERATURE: 99 F | RESPIRATION RATE: 20 BRPM

## 2024-11-07 DIAGNOSIS — Z90.89 ACQUIRED ABSENCE OF OTHER ORGANS: Chronic | ICD-10-CM

## 2024-11-07 DIAGNOSIS — Z98.41 CATARACT EXTRACTION STATUS, RIGHT EYE: Chronic | ICD-10-CM

## 2024-11-07 DIAGNOSIS — C91.10 CHRONIC LYMPHOCYTIC LEUKEMIA OF B-CELL TYPE NOT HAVING ACHIEVED REMISSION: ICD-10-CM

## 2024-11-07 DIAGNOSIS — Z29.9 ENCOUNTER FOR PROPHYLACTIC MEASURES, UNSPECIFIED: ICD-10-CM

## 2024-11-07 DIAGNOSIS — K52.9 NONINFECTIVE GASTROENTERITIS AND COLITIS, UNSPECIFIED: ICD-10-CM

## 2024-11-07 DIAGNOSIS — Z98.890 OTHER SPECIFIED POSTPROCEDURAL STATES: Chronic | ICD-10-CM

## 2024-11-07 DIAGNOSIS — R09.89 OTHER SPECIFIED SYMPTOMS AND SIGNS INVOLVING THE CIRCULATORY AND RESPIRATORY SYSTEMS: ICD-10-CM

## 2024-11-07 LAB
ADD ON TEST-SPECIMEN IN LAB: SIGNIFICANT CHANGE UP
ALBUMIN SERPL ELPH-MCNC: 4.2 G/DL — SIGNIFICANT CHANGE UP (ref 3.3–5)
ALP SERPL-CCNC: 51 U/L — SIGNIFICANT CHANGE UP (ref 40–120)
ALT FLD-CCNC: 14 U/L — SIGNIFICANT CHANGE UP (ref 10–45)
ANION GAP SERPL CALC-SCNC: 11 MMOL/L — SIGNIFICANT CHANGE UP (ref 5–17)
AST SERPL-CCNC: 13 U/L — SIGNIFICANT CHANGE UP (ref 10–40)
BASOPHILS # BLD AUTO: 0.05 K/UL — SIGNIFICANT CHANGE UP (ref 0–0.2)
BASOPHILS NFR BLD AUTO: 0.6 % — SIGNIFICANT CHANGE UP (ref 0–2)
BILIRUB SERPL-MCNC: 0.4 MG/DL — SIGNIFICANT CHANGE UP (ref 0.2–1.2)
BUN SERPL-MCNC: 11 MG/DL — SIGNIFICANT CHANGE UP (ref 7–23)
CALCIUM SERPL-MCNC: 9.7 MG/DL — SIGNIFICANT CHANGE UP (ref 8.4–10.5)
CHLORIDE SERPL-SCNC: 103 MMOL/L — SIGNIFICANT CHANGE UP (ref 96–108)
CO2 SERPL-SCNC: 25 MMOL/L — SIGNIFICANT CHANGE UP (ref 22–31)
CREAT SERPL-MCNC: 0.69 MG/DL — SIGNIFICANT CHANGE UP (ref 0.5–1.3)
EGFR: 93 ML/MIN/1.73M2 — SIGNIFICANT CHANGE UP
EOSINOPHIL # BLD AUTO: 0.12 K/UL — SIGNIFICANT CHANGE UP (ref 0–0.5)
EOSINOPHIL NFR BLD AUTO: 1.5 % — SIGNIFICANT CHANGE UP (ref 0–6)
GLUCOSE SERPL-MCNC: 104 MG/DL — HIGH (ref 70–99)
HCT VFR BLD CALC: 32.9 % — LOW (ref 34.5–45)
HGB BLD-MCNC: 11.1 G/DL — LOW (ref 11.5–15.5)
IMM GRANULOCYTES NFR BLD AUTO: 0.3 % — SIGNIFICANT CHANGE UP (ref 0–0.9)
LIDOCAIN IGE QN: 17 U/L — SIGNIFICANT CHANGE UP (ref 7–60)
LYMPHOCYTES # BLD AUTO: 1.41 K/UL — SIGNIFICANT CHANGE UP (ref 1–3.3)
LYMPHOCYTES # BLD AUTO: 18 % — SIGNIFICANT CHANGE UP (ref 13–44)
MCHC RBC-ENTMCNC: 31.1 PG — SIGNIFICANT CHANGE UP (ref 27–34)
MCHC RBC-ENTMCNC: 33.7 G/DL — SIGNIFICANT CHANGE UP (ref 32–36)
MCV RBC AUTO: 92.2 FL — SIGNIFICANT CHANGE UP (ref 80–100)
MONOCYTES # BLD AUTO: 0.61 K/UL — SIGNIFICANT CHANGE UP (ref 0–0.9)
MONOCYTES NFR BLD AUTO: 7.8 % — SIGNIFICANT CHANGE UP (ref 2–14)
NEUTROPHILS # BLD AUTO: 5.61 K/UL — SIGNIFICANT CHANGE UP (ref 1.8–7.4)
NEUTROPHILS NFR BLD AUTO: 71.8 % — SIGNIFICANT CHANGE UP (ref 43–77)
NRBC # BLD: 0 /100 WBCS — SIGNIFICANT CHANGE UP (ref 0–0)
PLATELET # BLD AUTO: 215 K/UL — SIGNIFICANT CHANGE UP (ref 150–400)
POTASSIUM SERPL-MCNC: 3.7 MMOL/L — SIGNIFICANT CHANGE UP (ref 3.5–5.3)
POTASSIUM SERPL-SCNC: 3.7 MMOL/L — SIGNIFICANT CHANGE UP (ref 3.5–5.3)
PROT SERPL-MCNC: 6.9 G/DL — SIGNIFICANT CHANGE UP (ref 6–8.3)
RBC # BLD: 3.57 M/UL — LOW (ref 3.8–5.2)
RBC # FLD: 13.1 % — SIGNIFICANT CHANGE UP (ref 10.3–14.5)
SODIUM SERPL-SCNC: 139 MMOL/L — SIGNIFICANT CHANGE UP (ref 135–145)
WBC # BLD: 7.82 K/UL — SIGNIFICANT CHANGE UP (ref 3.8–10.5)
WBC # FLD AUTO: 7.82 K/UL — SIGNIFICANT CHANGE UP (ref 3.8–10.5)

## 2024-11-07 PROCEDURE — 93010 ELECTROCARDIOGRAM REPORT: CPT

## 2024-11-07 PROCEDURE — 99285 EMERGENCY DEPT VISIT HI MDM: CPT

## 2024-11-07 RX ORDER — ACETAMINOPHEN 500 MG
650 TABLET ORAL EVERY 6 HOURS
Refills: 0 | Status: DISCONTINUED | OUTPATIENT
Start: 2024-11-07 | End: 2024-11-08

## 2024-11-07 RX ORDER — METRONIDAZOLE 250 MG/1
500 TABLET ORAL EVERY 8 HOURS
Refills: 0 | Status: DISCONTINUED | OUTPATIENT
Start: 2024-11-08 | End: 2024-11-08

## 2024-11-07 RX ORDER — ROSUVASTATIN CALCIUM 10 MG
1 TABLET ORAL
Refills: 0 | DISCHARGE

## 2024-11-07 RX ORDER — MELATONIN 5 MG
3 TABLET ORAL AT BEDTIME
Refills: 0 | Status: DISCONTINUED | OUTPATIENT
Start: 2024-11-07 | End: 2024-11-08

## 2024-11-07 RX ORDER — MAGNESIUM, ALUMINUM HYDROXIDE 200-200 MG
30 TABLET,CHEWABLE ORAL EVERY 4 HOURS
Refills: 0 | Status: DISCONTINUED | OUTPATIENT
Start: 2024-11-07 | End: 2024-11-08

## 2024-11-07 RX ORDER — ROSUVASTATIN CALCIUM 10 MG
5 TABLET ORAL AT BEDTIME
Refills: 0 | Status: DISCONTINUED | OUTPATIENT
Start: 2024-11-07 | End: 2024-11-08

## 2024-11-07 RX ORDER — SODIUM CHLORIDE 9 MG/ML
1000 INJECTION, SOLUTION INTRAMUSCULAR; INTRAVENOUS; SUBCUTANEOUS ONCE
Refills: 0 | Status: COMPLETED | OUTPATIENT
Start: 2024-11-07 | End: 2024-11-07

## 2024-11-07 RX ORDER — INFLUENZ VIR VAC TV P-SURF2003 15MCG/.5ML
0.5 SYRINGE (ML) INTRAMUSCULAR ONCE
Refills: 0 | Status: DISCONTINUED | OUTPATIENT
Start: 2024-11-07 | End: 2024-11-08

## 2024-11-07 RX ORDER — ALPRAZOLAM 0.25 MG
0.5 TABLET ORAL AT BEDTIME
Refills: 0 | Status: DISCONTINUED | OUTPATIENT
Start: 2024-11-07 | End: 2024-11-08

## 2024-11-07 RX ORDER — ENOXAPARIN SODIUM 40MG/0.4ML
40 SYRINGE (ML) SUBCUTANEOUS EVERY 24 HOURS
Refills: 0 | Status: DISCONTINUED | OUTPATIENT
Start: 2024-11-07 | End: 2024-11-08

## 2024-11-07 RX ORDER — METOPROLOL TARTRATE 50 MG
25 TABLET ORAL DAILY
Refills: 0 | Status: DISCONTINUED | OUTPATIENT
Start: 2024-11-07 | End: 2024-11-08

## 2024-11-07 RX ORDER — METRONIDAZOLE 250 MG/1
500 TABLET ORAL ONCE
Refills: 0 | Status: COMPLETED | OUTPATIENT
Start: 2024-11-07 | End: 2024-11-07

## 2024-11-07 RX ORDER — CEFTRIAXONE SODIUM 10 G
2000 VIAL (EA) INJECTION EVERY 24 HOURS
Refills: 0 | Status: DISCONTINUED | OUTPATIENT
Start: 2024-11-08 | End: 2024-11-08

## 2024-11-07 RX ORDER — ALPRAZOLAM 0.25 MG
1 TABLET ORAL
Refills: 0 | DISCHARGE

## 2024-11-07 RX ORDER — ALPRAZOLAM 0.25 MG
0.5 TABLET ORAL ONCE
Refills: 0 | Status: DISCONTINUED | OUTPATIENT
Start: 2024-11-07 | End: 2024-11-07

## 2024-11-07 RX ORDER — CEFTRIAXONE SODIUM 10 G
1000 VIAL (EA) INJECTION ONCE
Refills: 0 | Status: COMPLETED | OUTPATIENT
Start: 2024-11-07 | End: 2024-11-07

## 2024-11-07 RX ORDER — ONDANSETRON HYDROCHLORIDE 2 MG/ML
4 INJECTION, SOLUTION INTRAMUSCULAR; INTRAVENOUS EVERY 8 HOURS
Refills: 0 | Status: DISCONTINUED | OUTPATIENT
Start: 2024-11-07 | End: 2024-11-08

## 2024-11-07 RX ORDER — METOPROLOL TARTRATE 50 MG
25 TABLET ORAL ONCE
Refills: 0 | Status: COMPLETED | OUTPATIENT
Start: 2024-11-07 | End: 2024-11-07

## 2024-11-07 RX ADMIN — METRONIDAZOLE 100 MILLIGRAM(S): 250 TABLET ORAL at 20:58

## 2024-11-07 RX ADMIN — SODIUM CHLORIDE 1000 MILLILITER(S): 9 INJECTION, SOLUTION INTRAMUSCULAR; INTRAVENOUS; SUBCUTANEOUS at 20:08

## 2024-11-07 RX ADMIN — Medication 0.5 MILLIGRAM(S): at 20:57

## 2024-11-07 RX ADMIN — Medication 100 MILLIGRAM(S): at 20:47

## 2024-11-07 NOTE — H&P ADULT - PROBLEM SELECTOR PLAN 3
Follows with Dr Christian Jay at [place]. 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 Patient's daughter would like a heme/onc consult during this admission. However, patient does not want one.   - continue to monitor

## 2024-11-07 NOTE — ED PROVIDER NOTE - OBJECTIVE STATEMENT
70 y/o f hx HTN, anxiety presents c/o right lower abd pain for the past 2 days, had CT a/p showing colitis today.  Pt stating she hasn't been in severe pain, hasn't been taking anything for pain but feeling uncomfortable which prompted her visit to her pmd today.  Pt was sent for CT a/p at outpatient radiology and was sent to ED after results showing colitis.  Denies fever, chills, n/v/d, urinary sx, all other ROS negative.

## 2024-11-07 NOTE — ED PROVIDER NOTE - CLINICAL SUMMARY MEDICAL DECISION MAKING FREE TEXT BOX
70 y/o f hx HTN, anxiety presents c/o lower abd pain x 2 days, had outpatient CT a/p today showing colitis.  Pt afebrile in ED, comfortable on exam, declined pain meds.  Labs unremarkable, CT with pt showing colitis, inflammation around area of cecum.  Discussed with Dr. Johnson who recommends pt be admitted under his service, start on IV abx, given ceftriaxone and flagyl in ED.

## 2024-11-07 NOTE — H&P ADULT - PROBLEM SELECTOR PLAN 2
Patient with history of HTN managed with Toprol 25mg QD. Goal BP <130/80.  Xanax 1mg PRN. Patient takes 0.5mg when hypertensive. Can take an additional 0.5mg if not effective. Follows with Dr Rodriguez (nephro) for HTN  - Initial workup: lipid profile, TSH  - DASH diet  - Meds: cont home therapy Patient with history of HTN managed with Toprol 25mg QD. Goal BP <130/80.  Xanax 1mg PRN. Patient takes 0.5mg when hypertensive. Can take an additional 0.5mg if not effective. Follows with Dr Rodriguez (nephro) for HTN  - DASH diet  - Meds: cont home therapy

## 2024-11-07 NOTE — ED ADULT TRIAGE NOTE - BSA (M2)
You can access the FollowMyHealth Patient Portal offered by [facility] by registering at the following website: [email or web address]. By joining [facility]’s FollowMyHealth portal, you will also be able to view your health information using other applications (apps) compatible with our system.
1.67

## 2024-11-07 NOTE — ED ADULT NURSE NOTE - ISAR MEMORY
Reasonably controlled with current regimen.  Will continue with current regimen (sign scale insulin as needed) and continue to monitor.     No

## 2024-11-07 NOTE — ED PROVIDER NOTE - ATTENDING APP SHARED VISIT CONTRIBUTION OF CARE
68 y/o f hx HTN, anxiety presents c/o lower abd pain x 2 days, had outpatient CT a/p today showing colitis.  Pt afebrile in ED, comfortable on exam, declined pain meds.  Labs unremarkable, CT with pt showing colitis, inflammation around area of cecum.  Discussed with Dr. Johnson who recommends pt be admitted under his service, start on IV abx, given ceftriaxone and flagyl in ED.

## 2024-11-07 NOTE — ED ADULT NURSE NOTE - NSFALLUNIVINTERV_ED_ALL_ED
Bed/Stretcher in lowest position, wheels locked, appropriate side rails in place/Call bell, personal items and telephone in reach/Instruct patient to call for assistance before getting out of bed/chair/stretcher/Non-slip footwear applied when patient is off stretcher/[place] to call system/Physically safe environment - no spills, clutter or unnecessary equipment/Purposeful proactive rounding/Room/bathroom lighting operational, light cord in reach

## 2024-11-07 NOTE — ED ADULT TRIAGE NOTE - CHIEF COMPLAINT QUOTE
69 y.o. Female presents to ED c/o R sided abdominal pain x days, CT scan this AM. Sent in by Dr. Johnson for colitis eval. febrile 2 days ago 101F and has been afebrile since. Denies cp, sob, chills, nausea, vomiting, diarrhea. PMHX CLL.

## 2024-11-07 NOTE — H&P ADULT - NSHPLABSRESULTS_GEN_ALL_CORE
LABS:                         11.1   7.82  )-----------( 215      ( 07 Nov 2024 19:55 )             32.9     11-07    139  |  103  |  11  ----------------------------<  104[H]  3.7   |  25  |  0.69    Ca    9.7      07 Nov 2024 19:55    TPro  6.9  /  Alb  4.2  /  TBili  0.4  /  DBili  x   /  AST  13  /  ALT  14  /  AlkPhos  51  11-07      Urinalysis Basic - ( 07 Nov 2024 19:55 )    Color: x / Appearance: x / SG: x / pH: x  Gluc: 104 mg/dL / Ketone: x  / Bili: x / Urobili: x   Blood: x / Protein: x / Nitrite: x   Leuk Esterase: x / RBC: x / WBC x   Sq Epi: x / Non Sq Epi: x / Bacteria: x                RADIOLOGY, EKG & ADDITIONAL TESTS: Reviewed

## 2024-11-07 NOTE — H&P ADULT - NSHPPHYSICALEXAM_GEN_ALL_CORE
T(C): 36.7 (11-07-24 @ 21:33), Max: 37.2 (11-07-24 @ 19:07)  HR: 87 (11-07-24 @ 21:33) (87 - 111)  BP: 162/80 (11-07-24 @ 21:33) (140/102 - 186/90)  RR: 18 (11-07-24 @ 21:33) (18 - 20)  SpO2: 99% (11-07-24 @ 21:33) (97% - 99%)    General: NAD, laying in bed, speaking in full sentences  HEENT: head NC/AT, no conjunctival injection, EOMI, MMM  Neck: supple, no JVD  Cardio: RRR, +S1/S2, no M/R/G  Resp: lungs CTAB, no cough/wheezes/rales/rhonchi  Abdo: soft, NT, ND, +bowel sounds x4, TTP in RLQ  Extremities: WWP, no edema/cyanosis/clubbing, restricted ROM in left shoulder  Vasc: 2+ radial and DP pulses b/l  Neuro: A&Ox3  Psych: speech non-pressured, thoughts goal-oriented  Skin: dry, intact, no visible jaundice

## 2024-11-07 NOTE — H&P ADULT - ASSESSMENT
70 y/o female with past medical history of being a former smoker (quit smoking 40 years ago),HTN, labile pressures,  anxiety, CLL, cutaneous lymphoma s/p RT in January presenting with right lower abdominal pain, found to have colitis, admitted for IV antibiotics.

## 2024-11-07 NOTE — ED ADULT NURSE NOTE - OBJECTIVE STATEMENT
70 y/o F c/o R sided abdominal pain, decreased appetite, TMAX 101F on 11/05/24, Reffered to ER by [facility]. At this time pt denies bloody stool, chest pain, SOB, N/V/D, chills, fever, dizziness, lightheadedness. PT A&Ox4, respirations even and unlabored, skin color WDL warm and dry, pt is ambulatory with a steady gait. No acute distress observed.

## 2024-11-07 NOTE — ED ADULT TRIAGE NOTE - PRO INTERPRETER NEED 2
English
Medication for your headaches have been sent to your pharmacy. Follow up with your primary doctor and a neurologist, referral is included in your discharge packet. Advance activity as tolerated.  Continue all previously prescribed medications as directed.  Follow up with your primary care physician in 48-72 hours- bring copies of your results.  Return to the ER for worsening or persistent symptoms, and/or ANY NEW OR CONCERNING SYMPTOMS. If you have issues obtaining follow up, please call: [phone] to obtain a doctor or specialist who takes your insurance in your area.  You may call [phone] to make an appointment with the internal medicine clinic.

## 2024-11-07 NOTE — PATIENT PROFILE ADULT - FALL HARM RISK - UNIVERSAL INTERVENTIONS
Bed in lowest position, wheels locked, appropriate side rails in place/Call bell, personal items and telephone in reach/Instruct patient to call for assistance before getting out of bed or chair/Non-slip footwear when patient is out of bed/[place] to call system/Physically safe environment - no spills, clutter or unnecessary equipment/Purposeful Proactive Rounding/Room/bathroom lighting operational, light cord in reach

## 2024-11-07 NOTE — H&P ADULT - PROBLEM SELECTOR PLAN 1
Patient with RLQ abdominal pain and bloating for 2 days.  CTAP outpatient - long segment of moderate wall thickening involving the right colon - nonspecific colitis. Wall thickening more focal in cecum   s/p CTX 1g, Flagyl 500mg,  - c/w CTX 2g q24  - c/w flagyl 500mg q8  - GI PCR

## 2024-11-07 NOTE — H&P ADULT - HISTORY OF PRESENT ILLNESS
68 y/o f hx HTN, anxiety presents c/o right lower abd pain for the past 2 days, had CT a/p showing colitis today.  Pt stating she hasn't been in severe pain, hasn't been taking anything for pain but feeling uncomfortable which prompted her visit to her pmd today.  Pt was sent for CT a/p at outpatient radiology and was sent to ED after results showing colitis.  Denies fever, chills, n/v/d, urinary sx, all other ROS negative. 70 y/o f hx HTN, anxiety, CLL, cutaneous lymphoma s/p RT in January presents c/o right lower abd pain for the past 2 days, had CT a/p showing colitis today.  Pt stating she hasn't been in severe pain, hasn't been taking anything for pain but feeling uncomfortable which prompted her visit to her pmd today.  Pt was sent for CT a/p at outpatient radiology and was sent to ED after results showing colitis.  Denies fever, chills, n/v/d, urinary sx, all other ROS negative. 70 y/o female with past medical history of being a former smoker (quit smoking 40 years ago),HTN, anxiety, CLL, cutaneous lymphoma s/p RT in January presenting withright lower abdominal pain for the past 2 days  Patient states she hasn't been in severe pain, hasn't been taking anything over the counter for pain but felt uncomfortable which prompted her visit to her PCP today.  Patient was sent for CT Abdomen Pelvis at outpatient radiology and was sent to ED after results showed colitis.  Denies fever, chills, nausea, vomiting, urinary symptoms, chest pain, SOB.     ED Course:  Vitals: 99F, , /102, RR 20 (97%) on RA   Labs: Hgb 11.1   Imaging: CXR pending   EKG: NSR 92bpm  Interventions: CTX 1g, Flagyl 500mg, 1L NS, Xanax 0.5mg, Toprol 25mg    70 y/o female with past medical history of being a former smoker (quit smoking 40 years ago),HTN, anxiety, CLL, 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 Per the patient, she had a fever of 101 on Tuesday. Fever resolved without recurrence.  Patient was sent for CT Abdomen Pelvis at outpatient radiology and was sent to ED after results showed colitis.  Denies fever, chills, nausea, vomiting, urinary symptoms, chest pain, SOB. States that she just "didn't feel right" and her abdomen felt bloated. Last bowel movement 11/7 AM - formed. No history of diarrhea. Patient also has a history of labile blood pressures. Follows with Dr Rodriguez outpatient. Takes 0.5mg Xanex which brings her pressures down. She can take an additional 0.5mg if her pressures do not improve.     ED Course:  Vitals: 99F, , /102, RR 20 (97%) on RA   Labs: Hgb 11.1   Imaging: CXR outpatient - no acute pathology  CTAP outpatient - long segment of moderate wall thickening involving the right colon - nonspecific colitis. Wall thickening more focal in secum   EKG: NSR 92bpm  Interventions: CTX 1g, Flagyl 500mg, 1L NS, Xanax 0.5mg, Toprol 25mg    70 y/o female with past medical history of being a former smoker (quit smoking 40 years ago),HTN, labile pressures, CLL, 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 Per the patient, she had a fever of 101 on Tuesday. Fever resolved without recurrence.  Patient was sent for CT Abdomen Pelvis at outpatient radiology and was sent to ED after results showed colitis.  Denies fever, chills, nausea, vomiting, urinary symptoms, chest pain, SOB. States that she just "didn't feel right" and her abdomen felt bloated. Last bowel movement 11/7 AM - formed. No history of diarrhea. Patient also has a history of labile blood pressures. Follows with Dr Rodriguez outpatient. Takes 0.5mg Xanex which brings her pressures down. She can take an additional 0.5mg if her pressures do not improve.     ED Course:  Vitals: 99F, , /102, RR 20 (97%) on RA   Labs: Hgb 11.1   Imaging: CXR outpatient - no acute pathology  CTAP outpatient - long segment of moderate wall thickening involving the right colon - nonspecific colitis. Wall thickening more focal in cecum   EKG: NSR 92bpm  Interventions: CTX 1g, Flagyl 500mg, 1L NS, Xanax 0.5mg, Toprol 25mg

## 2024-11-07 NOTE — ED PROVIDER NOTE - CROS ED ROS STATEMENT
Airway  Performed by: Jessica Edmondson DO  Authorized by: Jessica Edmondson DO     Final Airway Type:  Endotracheal airway  Final Endotracheal Airway*:  ETT  ETT Size (mm)*:  7.0  Cuff*:  Regular  Technique Used for Successful ETT Placement:  Direct laryngoscopy  Devices/Methods Used in Placement*:  Mask  Intubation Procedure*:  Preoxygenation, ETCO2, Atraumatic, Dentition Unchanged and Phaynx Clear  Insertion Site:  Oral  Blade Type*:  MAC  Blade Size*:  3  Measured from*:  Lips  Secured at (cm)*:  21  Placement Verified by: auscultation and capnometry    Glottic View*:  2 - partial view of glottis  Attempts*:  1  Location:  OR  Urgency:  Elective  Difficult Airway: No    Indications for Airway Management:  Anesthesia  Mask Difficulty Assessment:  1 - vent by mask  Anesthesiologist:  Jessica Edmondson DO   Smooth and gentle intubation. Atraumatic. No issues.
Nerve Block  Performed by: Harmeet Barreto DO  Authorized by: Harmeet Barreto DO     Block Type:  Lower Extremity  Lower Extremity:  Popliteal  Patient Location:  OR  Indication: post-op pain management    patient identified, IV checked, risks and benefits discussed, surgical consent, monitors and equipment checked, pre-op evaluation and timeout performed    Patient Position:  Prone  Prep:  Chlorhexidine gluconate (CHG)  Max Sterile Barrier Technique:  Hand Washing, Cap/Mask and Sterile gloves  Monitoring:  Continuous pulse oximetry, blood pressure and EKG  Injection Technique:  Single-shot  Procedures: ultrasound guided    Local Infiltration:  Lidocaine  Needle Gauge:  21 G  Needle Length:  4 in  Needle Localization:  Ultrasound guidance  Physical status during block:  Awake  Injection Assessment:  Negative aspiration for heme, no paresthesia on injection, no resistance to injection and incremental injection  Patient Condition:  Tolerated well, no immediate complications  Paresthesia Pain:  None  Heart Rate Change: No    Slowly Injected: Yes    Anesthesiologist:  Kirit Forrest DO   Us guided popliteal nerve block done. Patient tolerated well. No complaints. No paresthesia. Negative heme pre injection and q 5 ml. Total of 30 ml of 0.5% ropivicaine with epi injected. Negative test dose.
all other ROS negative except as per HPI

## 2024-11-08 ENCOUNTER — TRANSCRIPTION ENCOUNTER (OUTPATIENT)
Age: 69
End: 2024-11-08

## 2024-11-08 VITALS
SYSTOLIC BLOOD PRESSURE: 155 MMHG | RESPIRATION RATE: 18 BRPM | HEART RATE: 82 BPM | OXYGEN SATURATION: 98 % | DIASTOLIC BLOOD PRESSURE: 82 MMHG | TEMPERATURE: 98 F

## 2024-11-08 LAB
ALBUMIN SERPL ELPH-MCNC: 3.9 G/DL — SIGNIFICANT CHANGE UP (ref 3.3–5)
ALBUMIN SERPL ELPH-MCNC: 4.5 G/DL
ALDOSTERONE SERUM: 7.6 NG/DL
ALP BLD-CCNC: 50 U/L
ALP SERPL-CCNC: 48 U/L — SIGNIFICANT CHANGE UP (ref 40–120)
ALT FLD-CCNC: 12 U/L — SIGNIFICANT CHANGE UP (ref 10–45)
ALT SERPL-CCNC: 14 U/L
ANION GAP SERPL CALC-SCNC: 12 MMOL/L
ANION GAP SERPL CALC-SCNC: 12 MMOL/L — SIGNIFICANT CHANGE UP (ref 5–17)
APPEARANCE UR: CLEAR — SIGNIFICANT CHANGE UP
AST SERPL-CCNC: 12 U/L — SIGNIFICANT CHANGE UP (ref 10–40)
AST SERPL-CCNC: 13 U/L
BILIRUB SERPL-MCNC: 0.4 MG/DL
BILIRUB SERPL-MCNC: 0.4 MG/DL — SIGNIFICANT CHANGE UP (ref 0.2–1.2)
BILIRUB UR-MCNC: NEGATIVE — SIGNIFICANT CHANGE UP
BLD GP AB SCN SERPL QL: NEGATIVE — SIGNIFICANT CHANGE UP
BUN SERPL-MCNC: 12 MG/DL
BUN SERPL-MCNC: 8 MG/DL — SIGNIFICANT CHANGE UP (ref 7–23)
CALCIUM SERPL-MCNC: 9.4 MG/DL — SIGNIFICANT CHANGE UP (ref 8.4–10.5)
CALCIUM SERPL-MCNC: 9.6 MG/DL
CHLORIDE SERPL-SCNC: 104 MMOL/L
CHLORIDE SERPL-SCNC: 105 MMOL/L — SIGNIFICANT CHANGE UP (ref 96–108)
CO2 SERPL-SCNC: 24 MMOL/L — SIGNIFICANT CHANGE UP (ref 22–31)
CO2 SERPL-SCNC: 25 MMOL/L
COLOR SPEC: YELLOW — SIGNIFICANT CHANGE UP
CREAT SERPL-MCNC: 0.72 MG/DL
CREAT SERPL-MCNC: 0.77 MG/DL — SIGNIFICANT CHANGE UP (ref 0.5–1.3)
DIFF PNL FLD: NEGATIVE — SIGNIFICANT CHANGE UP
EGFR: 82 ML/MIN/1.73M2 — SIGNIFICANT CHANGE UP
EGFR: 89 ML/MIN/1.73M2
GLUCOSE SERPL-MCNC: 102 MG/DL
GLUCOSE SERPL-MCNC: 98 MG/DL — SIGNIFICANT CHANGE UP (ref 70–99)
GLUCOSE UR QL: NEGATIVE MG/DL — SIGNIFICANT CHANGE UP
HCT VFR BLD CALC: 30.8 % — LOW (ref 34.5–45)
HGB BLD-MCNC: 10.1 G/DL — LOW (ref 11.5–15.5)
KETONES UR-MCNC: NEGATIVE MG/DL — SIGNIFICANT CHANGE UP
LEUKOCYTE ESTERASE UR-ACNC: ABNORMAL
MAGNESIUM SERPL-MCNC: 2 MG/DL — SIGNIFICANT CHANGE UP (ref 1.6–2.6)
MCHC RBC-ENTMCNC: 31 PG — SIGNIFICANT CHANGE UP (ref 27–34)
MCHC RBC-ENTMCNC: 32.8 G/DL — SIGNIFICANT CHANGE UP (ref 32–36)
MCV RBC AUTO: 94.5 FL — SIGNIFICANT CHANGE UP (ref 80–100)
NITRITE UR-MCNC: NEGATIVE — SIGNIFICANT CHANGE UP
NRBC # BLD: 0 /100 WBCS — SIGNIFICANT CHANGE UP (ref 0–0)
PH UR: 7 — SIGNIFICANT CHANGE UP (ref 5–8)
PHOSPHATE SERPL-MCNC: 3.1 MG/DL — SIGNIFICANT CHANGE UP (ref 2.5–4.5)
PLATELET # BLD AUTO: 210 K/UL — SIGNIFICANT CHANGE UP (ref 150–400)
POTASSIUM SERPL-MCNC: 3.7 MMOL/L — SIGNIFICANT CHANGE UP (ref 3.5–5.3)
POTASSIUM SERPL-SCNC: 3.7 MMOL/L — SIGNIFICANT CHANGE UP (ref 3.5–5.3)
POTASSIUM SERPL-SCNC: 3.9 MMOL/L
PROT SERPL-MCNC: 6.2 G/DL — SIGNIFICANT CHANGE UP (ref 6–8.3)
PROT SERPL-MCNC: 6.3 G/DL
PROT UR-MCNC: NEGATIVE MG/DL — SIGNIFICANT CHANGE UP
RBC # BLD: 3.26 M/UL — LOW (ref 3.8–5.2)
RBC # FLD: 13.2 % — SIGNIFICANT CHANGE UP (ref 10.3–14.5)
RH IG SCN BLD-IMP: POSITIVE — SIGNIFICANT CHANGE UP
SODIUM SERPL-SCNC: 141 MMOL/L
SODIUM SERPL-SCNC: 141 MMOL/L — SIGNIFICANT CHANGE UP (ref 135–145)
SP GR SPEC: >1.03 — HIGH (ref 1–1.03)
TSH SERPL-ACNC: 0.96 UIU/ML
UROBILINOGEN FLD QL: 0.2 MG/DL — SIGNIFICANT CHANGE UP (ref 0.2–1)
WBC # BLD: 7.18 K/UL — SIGNIFICANT CHANGE UP (ref 3.8–10.5)
WBC # FLD AUTO: 7.18 K/UL — SIGNIFICANT CHANGE UP (ref 3.8–10.5)

## 2024-11-08 PROCEDURE — 86140 C-REACTIVE PROTEIN: CPT

## 2024-11-08 PROCEDURE — 83735 ASSAY OF MAGNESIUM: CPT

## 2024-11-08 PROCEDURE — 85652 RBC SED RATE AUTOMATED: CPT

## 2024-11-08 PROCEDURE — 96375 TX/PRO/DX INJ NEW DRUG ADDON: CPT

## 2024-11-08 PROCEDURE — 85025 COMPLETE CBC W/AUTO DIFF WBC: CPT

## 2024-11-08 PROCEDURE — 83690 ASSAY OF LIPASE: CPT

## 2024-11-08 PROCEDURE — 87086 URINE CULTURE/COLONY COUNT: CPT

## 2024-11-08 PROCEDURE — 86901 BLOOD TYPING SEROLOGIC RH(D): CPT

## 2024-11-08 PROCEDURE — 80053 COMPREHEN METABOLIC PANEL: CPT

## 2024-11-08 PROCEDURE — 86900 BLOOD TYPING SEROLOGIC ABO: CPT

## 2024-11-08 PROCEDURE — 81001 URINALYSIS AUTO W/SCOPE: CPT

## 2024-11-08 PROCEDURE — 99285 EMERGENCY DEPT VISIT HI MDM: CPT

## 2024-11-08 PROCEDURE — 96374 THER/PROPH/DIAG INJ IV PUSH: CPT

## 2024-11-08 PROCEDURE — 84100 ASSAY OF PHOSPHORUS: CPT

## 2024-11-08 PROCEDURE — 85027 COMPLETE CBC AUTOMATED: CPT

## 2024-11-08 PROCEDURE — 36415 COLL VENOUS BLD VENIPUNCTURE: CPT

## 2024-11-08 PROCEDURE — 86850 RBC ANTIBODY SCREEN: CPT

## 2024-11-08 PROCEDURE — 93005 ELECTROCARDIOGRAM TRACING: CPT

## 2024-11-08 RX ORDER — CEFPODOXIME PROXETIL 200 MG/1
1 TABLET, FILM COATED ORAL
Qty: 12 | Refills: 0
Start: 2024-11-08 | End: 2024-11-13

## 2024-11-08 RX ORDER — METRONIDAZOLE 250 MG/1
1 TABLET ORAL
Qty: 18 | Refills: 0
Start: 2024-11-08 | End: 2024-11-13

## 2024-11-08 RX ADMIN — METRONIDAZOLE 100 MILLIGRAM(S): 250 TABLET ORAL at 05:38

## 2024-11-08 RX ADMIN — METRONIDAZOLE 100 MILLIGRAM(S): 250 TABLET ORAL at 13:08

## 2024-11-08 NOTE — DISCHARGE NOTE PROVIDER - NSDCCPCAREPLAN_GEN_ALL_CORE_FT
PRINCIPAL DISCHARGE DIAGNOSIS  Diagnosis: Colitis  Assessment and Plan of Treatment: Colitis is inflammation in your colon, which is the main part of your large intestine. Your colon is the last leg of the journey your food takes through your digestive system. Inflammation in your colon can affect the way this journey ends, causing pain, diarrhea and sometimes blood in your poop. Inflammation is your body’s response to infection or injury. It causes swelling and tenderness in your tissues.       PRINCIPAL DISCHARGE DIAGNOSIS  Diagnosis: Colitis  Assessment and Plan of Treatment: Colitis is inflammation in your colon, which is the main part of your large intestine. Your colon is the last leg of the journey your food takes through your digestive system. Inflammation in your colon can affect the way this journey ends, causing pain, diarrhea and sometimes blood in your poop. Inflammation is your body’s response to infection or injury. It causes swelling and tenderness in your tissues.  Please complete your course of metronidazole and cefpodoxime. Please do not drink alcohol while taking metronidazole.   Please follow up with your GI doctor as soon as able!

## 2024-11-08 NOTE — PROGRESS NOTE ADULT - SUBJECTIVE AND OBJECTIVE BOX
OVERNIGHT EVENTS:    SUBJECTIVE / INTERVAL HPI: Patient seen and examined at bedside.       PHYSICAL EXAM:    General: NAD  HEENT: NC/AT; PERRL, anicteric sclera; MMM  Neck: supple  Cardiovascular: +S1/S2, RRR  Respiratory: CTA B/L; no W/R/R  Gastrointestinal: soft, NT/ND; +BSx4  Extremities: WWP; no edema, clubbing or cyanosis  Vascular: 2+ radial, DP/PT pulses B/L  Neurological: AAOx3; no focal deficits  Psychiatric: pleasant mood and affect  Dermatologic: no appreciable wounds or damage to the skin    VITAL SIGNS:  Vital Signs Last 24 Hrs  T(C): 36.6 (08 Nov 2024 05:52), Max: 37.2 (07 Nov 2024 19:07)  T(F): 97.9 (08 Nov 2024 05:52), Max: 99 (07 Nov 2024 19:07)  HR: 75 (08 Nov 2024 05:52) (75 - 111)  BP: 149/77 (08 Nov 2024 05:52) (140/102 - 186/90)  BP(mean): --  RR: 18 (08 Nov 2024 05:52) (18 - 20)  SpO2: 97% (08 Nov 2024 05:52) (97% - 99%)    Parameters below as of 07 Nov 2024 23:44  Patient On (Oxygen Delivery Method): room air          MEDICATIONS:  MEDICATIONS  (STANDING):  cefTRIAXone   IVPB 2000 milliGRAM(s) IV Intermittent every 24 hours  enoxaparin Injectable 40 milliGRAM(s) SubCutaneous every 24 hours  influenza  Vaccine (HIGH DOSE) 0.5 milliLiter(s) IntraMuscular once  metoprolol succinate ER 25 milliGRAM(s) Oral daily  metroNIDAZOLE  IVPB 500 milliGRAM(s) IV Intermittent every 8 hours  rosuvastatin 5 milliGRAM(s) Oral at bedtime    MEDICATIONS  (PRN):  acetaminophen     Tablet .. 650 milliGRAM(s) Oral every 6 hours PRN Temp greater or equal to 38C (100.4F), Mild Pain (1 - 3)  ALPRAZolam 0.5 milliGRAM(s) Oral at bedtime PRN High blood pressure. Can take up to 1mg  aluminum hydroxide/magnesium hydroxide/simethicone Suspension 30 milliLiter(s) Oral every 4 hours PRN Dyspepsia  melatonin 3 milliGRAM(s) Oral at bedtime PRN Insomnia  ondansetron Injectable 4 milliGRAM(s) IV Push every 8 hours PRN Nausea and/or Vomiting      ALLERGIES:  Allergies    No Known Allergies    Intolerances        LABS:                        11.1   7.82  )-----------( 215      ( 07 Nov 2024 19:55 )             32.9     11-07    139  |  103  |  11  ----------------------------<  104[H]  3.7   |  25  |  0.69    Ca    9.7      07 Nov 2024 19:55    TPro  6.9  /  Alb  4.2  /  TBili  0.4  /  DBili  x   /  AST  13  /  ALT  14  /  AlkPhos  51  11-07      Urinalysis Basic - ( 07 Nov 2024 19:55 )    Color: x / Appearance: x / SG: x / pH: x  Gluc: 104 mg/dL / Ketone: x  / Bili: x / Urobili: x   Blood: x / Protein: x / Nitrite: x   Leuk Esterase: x / RBC: x / WBC x   Sq Epi: x / Non Sq Epi: x / Bacteria: x      CAPILLARY BLOOD GLUCOSE          RADIOLOGY & ADDITIONAL TESTS: Reviewed. SUBJECTIVE / INTERVAL HPI: Patient seen and examined at bedside. Pt resting comfortably. States she has no symptoms. She was only experiencing mild bloating and abdominal pain prior to her admission. States she thought she had appendicitis, so she called her PCP who had her get imaging. Denies fevers, chills, HA, chest pain, SOB, n/v/d, abd pain, dysuria. ROS otherwise negative.      PHYSICAL EXAM:    General: NAD  HEENT: NC/AT; PERRL, anicteric sclera; MMM  Neck: supple  Cardiovascular: +S1/S2, RRR  Respiratory: CTA B/L; no W/R/R  Gastrointestinal: soft, ND, mild TTP in RLQ; +BSx4  Extremities: WWP; no edema, clubbing or cyanosis  Vascular: 2+ radial, DP/PT pulses B/L  Neurological: AAOx3; no focal deficits  Psychiatric: pleasant mood and affect  Dermatologic: no appreciable wounds or damage to the skin    VITAL SIGNS:  Vital Signs Last 24 Hrs  T(C): 36.6 (08 Nov 2024 05:52), Max: 37.2 (07 Nov 2024 19:07)  T(F): 97.9 (08 Nov 2024 05:52), Max: 99 (07 Nov 2024 19:07)  HR: 75 (08 Nov 2024 05:52) (75 - 111)  BP: 149/77 (08 Nov 2024 05:52) (140/102 - 186/90)  BP(mean): --  RR: 18 (08 Nov 2024 05:52) (18 - 20)  SpO2: 97% (08 Nov 2024 05:52) (97% - 99%)    Parameters below as of 07 Nov 2024 23:44  Patient On (Oxygen Delivery Method): room air          MEDICATIONS:  MEDICATIONS  (STANDING):  cefTRIAXone   IVPB 2000 milliGRAM(s) IV Intermittent every 24 hours  enoxaparin Injectable 40 milliGRAM(s) SubCutaneous every 24 hours  influenza  Vaccine (HIGH DOSE) 0.5 milliLiter(s) IntraMuscular once  metoprolol succinate ER 25 milliGRAM(s) Oral daily  metroNIDAZOLE  IVPB 500 milliGRAM(s) IV Intermittent every 8 hours  rosuvastatin 5 milliGRAM(s) Oral at bedtime    MEDICATIONS  (PRN):  acetaminophen     Tablet .. 650 milliGRAM(s) Oral every 6 hours PRN Temp greater or equal to 38C (100.4F), Mild Pain (1 - 3)  ALPRAZolam 0.5 milliGRAM(s) Oral at bedtime PRN High blood pressure. Can take up to 1mg  aluminum hydroxide/magnesium hydroxide/simethicone Suspension 30 milliLiter(s) Oral every 4 hours PRN Dyspepsia  melatonin 3 milliGRAM(s) Oral at bedtime PRN Insomnia  ondansetron Injectable 4 milliGRAM(s) IV Push every 8 hours PRN Nausea and/or Vomiting      ALLERGIES:  Allergies    No Known Allergies    Intolerances        LABS:                        11.1   7.82  )-----------( 215      ( 07 Nov 2024 19:55 )             32.9     11-07    139  |  103  |  11  ----------------------------<  104[H]  3.7   |  25  |  0.69    Ca    9.7      07 Nov 2024 19:55    TPro  6.9  /  Alb  4.2  /  TBili  0.4  /  DBili  x   /  AST  13  /  ALT  14  /  AlkPhos  51  11-07      Urinalysis Basic - ( 07 Nov 2024 19:55 )    Color: x / Appearance: x / SG: x / pH: x  Gluc: 104 mg/dL / Ketone: x  / Bili: x / Urobili: x   Blood: x / Protein: x / Nitrite: x   Leuk Esterase: x / RBC: x / WBC x   Sq Epi: x / Non Sq Epi: x / Bacteria: x      CAPILLARY BLOOD GLUCOSE          RADIOLOGY & ADDITIONAL TESTS: Reviewed.

## 2024-11-08 NOTE — PROGRESS NOTE ADULT - PROBLEM SELECTOR PLAN 2
Patient with history of HTN managed with Toprol 25mg QD. Goal BP <130/80.  Xanax 1mg PRN. Patient takes 0.5mg when hypertensive. Can take an additional 0.5mg if not effective. Follows with Dr Rodriguez (nephro) for HTN  - DASH diet  - Meds: cont home therapy Patient with history of HTN managed with Toprol 25mg QD. Goal BP <130/80.  Xanax 1mg PRN. Patient takes 0.5mg when hypertensive. Can take an additional 0.5mg if not effective. Follows with Dr Rodriguez (nephro) for HTN    - Meds: cont home therapy

## 2024-11-08 NOTE — PROGRESS NOTE ADULT - PROBLEM SELECTOR PLAN 3
Follows with Dr Christian Jay at [place]. 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 Patient's daughter would like a heme/onc consult during this admission. However, patient does not want one.   - continue to monitor Follows with Dr Christian Jay at [place]. 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 Patient's daughter would like a heme/onc consult during this admission. However, patient does not want one.     - continue to monitor

## 2024-11-08 NOTE — DISCHARGE NOTE PROVIDER - HOSPITAL COURSE
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 Patient was sent for CT Abdomen Pelvis at outpatient radiology and was sent to ED after results showed colitis.  Denies fever, chills, nausea, vomiting, urinary symptoms, chest pain, SOB.    A CTAP outpatient was noted to show a long segment of moderate wall thickening involving the right colon - nonspecific colitis. Wall thickening more focal in cecum.      While inpatient the patient improved clinically and was deemed stable for discharge.    Problem List/Main Diagnoses:   #Colitis.   Patient with RLQ abdominal pain and bloating for 2 days.  CTAP outpatient - long segment of moderate wall thickening involving the right colon - nonspecific colitis. Wall thickening more focal in cecum   s/p CTX 1g, Flagyl 500mg,  - c/w cefpodoxime 200mg bid x 3 days  - c/w flagyl 500mg tid x 3 days    #Labile hypertension.   Patient with history of HTN managed with Toprol 25mg QD. Goal BP <130/80.  Xanax 1mg PRN. Patient takes 0.5mg when hypertensive. Can take an additional 0.5mg if not effective. Follows with Dr Rodriguez (nephro) for HTN  - Meds: cont home therapy.    #CLL (chronic lymphocytic leukemia).   Follows with Dr Christian Jay at [facility]. 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 Patient's daughter would like a heme/onc consult during this admission. However, patient does not want one.   - continue to monitor.    New medications/therapies: cefpodoxime 200mg bid x 3 days, flagyl 500mg tid x 3 days  New lines/hardware: none  Labs to be followed outpatient: none  Exam to be followed outpatient: PCP    Discharge plan: discharge to home.    Exam on Discharge  General: NAD, laying in bed, speaking in full sentences  HEENT: head NC/AT, no conjunctival injection, EOMI, MMM  Neck: supple, no JVD  Cardio: RRR, +S1/S2, no M/R/G  Resp: lungs CTAB, no cough/wheezes/rales/rhonchi  Abdo: soft, NT, ND, +bowel sounds x4, TTP in RLQ  Extremities: WWP, no edema/cyanosis/clubbing, restricted ROM in left shoulder  Vasc: 2+ radial and DP pulses b/l  Neuro: A&Ox3  Psych: speech non-pressured, thoughts goal-oriented  Skin: dry, intact, no visible jaundice     #Discharge: do not delete    Inpatient treatment course:   70 y/o female with past medical history of being a former smoker (quit smoking 40 years ago), HTN, labile pressures, CLL, 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 Patient was sent for CT Abdomen Pelvis at outpatient radiology and was sent to ED after results showed colitis.  Denies fever, chills, nausea, vomiting, urinary symptoms, chest pain, SOB.    A CTAP outpatient was noted to show a long segment of moderate wall thickening involving the right colon - nonspecific colitis. Wall thickening more focal in cecum.      While inpatient the patient improved clinically on IV antibiotics and was deemed stable for discharge.    Problem List/Main Diagnoses:   #Colitis.   Patient with RLQ abdominal pain and bloating for 2 days.  CTAP outpatient - long segment of moderate wall thickening involving the right colon - nonspecific colitis. Wall thickening more focal in cecum   s/p CTX 2g, Flagyl 500mg,  - c/w cefpodoxime 200mg bid x 7 day total  - c/w flagyl 500mg tid x 7 day total    #Labile hypertension.   Patient with history of HTN managed with Toprol 25mg QD. Goal BP <130/80.  Xanax 1mg PRN. Patient takes 0.5mg when hypertensive. Can take an additional 0.5mg if not effective. Follows with Dr Rodriguez (nephro) for HTN  - Meds: cont home therapy.    #CLL (chronic lymphocytic leukemia).   Follows with Dr Christian Jay at [facility]. 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 Patient's daughter would like a heme/onc consult during this admission. However, patient does not want one.   - continue to monitor.    New medications/therapies: cefpodoxime 200mg bid x 7 days, flagyl 500mg tid x 7 days ( 7 day total course of Abx)  New lines/hardware: none  Labs to be followed outpatient: none  Exam to be followed outpatient: PCP    Discharge plan: discharge to home.    Exam on Discharge  General: NAD, laying in bed, speaking in full sentences  HEENT: head NC/AT, no conjunctival injection, EOMI, MMM  Neck: supple, no JVD  Cardio: RRR, +S1/S2, no M/R/G  Resp: lungs CTAB, no cough/wheezes/rales/rhonchi  Abdo: soft, NT, ND, +bowel sounds x4, TTP in RLQ  Extremities: WWP, no edema/cyanosis/clubbing, restricted ROM in left shoulder  Vasc: 2+ radial and DP pulses b/l  Neuro: A&Ox3  Psych: speech non-pressured, thoughts goal-oriented  Skin: dry, intact, no visible jaundice

## 2024-11-08 NOTE — DISCHARGE NOTE NURSING/CASE MANAGEMENT/SOCIAL WORK - NSDCPEFALRISK_GEN_ALL_CORE
For information on Fall & Injury Prevention, visit: [email or web address] OR  [email or web address] OR  https://www.cdc.gov/steadi/patient.html

## 2024-11-08 NOTE — PROGRESS NOTE ADULT - SUBJECTIVE AND OBJECTIVE BOX
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 Fever resolved without recurrence.  Patient was sent for CT Abdomen Pelvis at outpatient radiology and was sent to ED after results showed right sided   colitis.  Denies fever, chills, nausea, vomiting, urinary symptoms, chest pain, SOB. States that she just "didn't feel right" and her abdomen felt bloated. Last bowel movement 11/7 AM - formed. No history of diarrhea. Patient also has a history of labile blood pressures. Follows with Dr Rodriguez outpatient. Takes 0.5mg Xanex which brings her pressures down. She can take an additional 0.5mg if her pressures do not improve.     ED Course:  Vitals: 99F, , /102, RR 20 (97%) on RA   Labs: Hgb 11.1   Imaging: CXR outpatient - no acute pathology  CTAP outpatient - long segment of moderate wall thickening involving the right colon - nonspecific colitis. Wall thickening more focal in cecum   EKG: NSR 92bpm  Interventions: CTX 1g, Flagyl 500mg, 1L NS, Xanax 0.5mg, Toprol 25mg    (07 Nov 2024 21:41)      REVIEW OF SYSTEMS:  Constitutional: No fever, weight loss or fatigue  ENMT:  No difficulty hearing, tinnitus, vertigo; No sinus or throat pain  Respiratory: No cough, wheezing, chills or hemoptysis  Cardiovascular: No chest pain, palpitations, dizziness or leg swelling  Gastrointestinal: mild pain. No nausea, vomiting or hematemesis; No diarrhea or constipation. No melena or hematochezia.  Skin: No itching, burning, rashes or lesions   Musculoskeletal: No joint pain or swelling; No muscle, back or extremity pain    PAST MEDICAL & SURGICAL HISTORY:  Hypertension      S/P lumbar spine operation x 2 titanium dipika      Status post tonsillectomy      Status post extracapsular cataract extraction of right eye    left TKR      FAMILY HISTORY:      SOCIAL HISTORY:  Smoking Status: [ ] Current, [ ] Former, [ ] Never  Pack Years:    MEDICATIONS:  MEDICATIONS  (STANDING):  cefTRIAXone   IVPB 2000 milliGRAM(s) IV Intermittent every 24 hours  enoxaparin Injectable 40 milliGRAM(s) SubCutaneous every 24 hours  influenza  Vaccine (HIGH DOSE) 0.5 milliLiter(s) IntraMuscular once  metoprolol succinate ER 25 milliGRAM(s) Oral daily  metroNIDAZOLE  IVPB 500 milliGRAM(s) IV Intermittent every 8 hours  rosuvastatin 5 milliGRAM(s) Oral at bedtime    MEDICATIONS  (PRN):  acetaminophen     Tablet .. 650 milliGRAM(s) Oral every 6 hours PRN Temp greater or equal to 38C (100.4F), Mild Pain (1 - 3)  ALPRAZolam 0.5 milliGRAM(s) Oral at bedtime PRN High blood pressure. Can take up to 1mg  aluminum hydroxide/magnesium hydroxide/simethicone Suspension 30 milliLiter(s) Oral every 4 hours PRN Dyspepsia  melatonin 3 milliGRAM(s) Oral at bedtime PRN Insomnia  ondansetron Injectable 4 milliGRAM(s) IV Push every 8 hours PRN Nausea and/or Vomiting      Allergies    No Known Allergies    Intolerances        Vital Signs Last 24 Hrs  T(C): 36.8 (08 Nov 2024 12:00), Max: 37.2 (07 Nov 2024 19:07)  T(F): 98.2 (08 Nov 2024 12:00), Max: 99 (07 Nov 2024 19:07)  HR: 82 (08 Nov 2024 12:00) (75 - 111)  BP: 155/82 (08 Nov 2024 12:00) (140/102 - 186/90)  BP(mean): --  RR: 18 (08 Nov 2024 12:00) (18 - 20)  SpO2: 98% (08 Nov 2024 12:00) (97% - 99%)    Parameters below as of 08 Nov 2024 12:00  Patient On (Oxygen Delivery Method): room air            PHYSICAL EXAM:    General: Well developed; well nourished; in no acute distress  HEENT: MMM, conjunctiva and sclera clear  Lungs: clear  Heart: regular  Gastrointestinal: Soft, sl-tender non-distended; Normal bowel sounds; No rebound or guarding  Extremities: Normal range of motion, No clubbing, cyanosis or edema  Neurological: Alert and oriented x3  Skin: Warm and dry. No obvious rash      LABS:                        10.1   7.18  )-----------( 210      ( 08 Nov 2024 07:14 )             30.8     11-08    141  |  105  |  8   ----------------------------<  98  3.7   |  24  |  0.77    Ca    9.4      08 Nov 2024 07:14  Phos  3.1     11-08  Mg     2.0     11-08    TPro  6.2  /  Alb  3.9  /  TBili  0.4  /  DBili  x   /  AST  12  /  ALT  12  /  AlkPhos  48  11-08          RADIOLOGY & ADDITIONAL STUDIES:

## 2024-11-08 NOTE — PROGRESS NOTE ADULT - PROBLEM SELECTOR PLAN 4
F: s/p 1L NS   E: replete as needed; Keep K>4, Mg >2   D: DASH  DVT Proph: Lovenox    Dispo: F F: s/p 1L NS   E: replete as needed; Keep K>4, Mg >2   D: Regular  DVT Proph: Lovenox  Dispo: [facility]

## 2024-11-08 NOTE — DISCHARGE NOTE PROVIDER - NSDCMRMEDTOKEN_GEN_ALL_CORE_FT
Crestor 5 mg oral tablet: 1 tab(s) orally once a day (at bedtime)  metoprolol succinate 25 mg oral tablet, extended release: 1 tab(s) orally once a day  Xanax 1 mg oral tablet: 1 tab(s) orally once a day (at bedtime)   cefpodoxime 200 mg oral tablet: 1 tab(s) orally 2 times a day  Crestor 5 mg oral tablet: 1 tab(s) orally once a day (at bedtime)  metoprolol succinate 25 mg oral tablet, extended release: 1 tab(s) orally once a day  metroNIDAZOLE 500 mg oral tablet: 1 tab(s) orally every 8 hours  Xanax 1 mg oral tablet: 1 tab(s) orally once a day (at bedtime)

## 2024-11-08 NOTE — DISCHARGE NOTE NURSING/CASE MANAGEMENT/SOCIAL WORK - PATIENT PORTAL LINK FT
You can access the FollowMyHealth Patient Portal offered by [facility] by registering at the following website: [email or web address]. By joining [employer]’s FollowMyHealth portal, you will also be able to view your health information using other applications (apps) compatible with our system.

## 2024-11-08 NOTE — PROGRESS NOTE ADULT - ASSESSMENT
right sided colitis  admitted for antibiotics  no issues overnight  can go home on po for 1 week  follow up with Dr Carmona next Thursday
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

## 2024-11-08 NOTE — PROGRESS NOTE ADULT - PROBLEM SELECTOR PLAN 1
Patient with RLQ abdominal pain and bloating for 2 days.  CTAP outpatient - long segment of moderate wall thickening involving the right colon - nonspecific colitis. Wall thickening more focal in cecum   s/p CTX 1g, Flagyl 500mg,  - c/w CTX 2g q24  - c/w flagyl 500mg q8  - GI PCR Patient with RLQ abdominal pain and bloating for 2 days.  CTAP outpatient - long segment of moderate wall thickening involving the right colon - nonspecific colitis. Wall thickening more focal in cecum   s/p CTX 1g, Flagyl 500mg in ED    - c/w CTX 2g q24  - c/w flagyl 500mg q8  - GI PCR with next BM

## 2024-11-08 NOTE — DISCHARGE NOTE PROVIDER - CARE PROVIDER_API CALL
Aj Johnson  Gastroenterology  [address], [address]  [place] [address]  Phone: [phone]  Fax: [phone]  Follow Up Time:     Bonny Hurtado  Nephrology  [address]  [place] [address]  Phone: [phone]  Fax: [phone]  Established Patient  Follow Up Time:

## 2024-11-08 NOTE — DISCHARGE NOTE NURSING/CASE MANAGEMENT/SOCIAL WORK - FINANCIAL ASSISTANCE
[facility] provides services at a reduced cost to those who are determined to be eligible through [facility]’s financial assistance program. Information regarding [facility]’s financial assistance program can be found by going to [email or web address] or by calling [phone].

## 2024-11-11 LAB
METANEPHRINE, PL: 45.7 PG/ML
NORMETANEPHRINE, PL: 80.6 PG/ML

## 2024-11-12 LAB
DOPAMINE UR-MCNC: <30 PG/ML
EPINEPH UR-MCNC: <15 PG/ML
NOREPINEPH UR-MCNC: 412 PG/ML

## 2024-11-13 ENCOUNTER — TRANSCRIPTION ENCOUNTER (OUTPATIENT)
Age: 69
End: 2024-11-13

## 2024-11-14 ENCOUNTER — TRANSCRIPTION ENCOUNTER (OUTPATIENT)
Age: 69
End: 2024-11-14

## 2024-11-15 ENCOUNTER — EMERGENCY (EMERGENCY)
Facility: HOSPITAL | Age: 69
LOS: 1 days | Discharge: HOME | End: 2024-11-15
Attending: STUDENT IN AN ORGANIZED HEALTH CARE EDUCATION/TRAINING PROGRAM | Admitting: STUDENT IN AN ORGANIZED HEALTH CARE EDUCATION/TRAINING PROGRAM
Payer: SELF-PAY

## 2024-11-15 VITALS
WEIGHT: 138.01 LBS | HEIGHT: 64 IN | HEART RATE: 118 BPM | TEMPERATURE: 97 F | RESPIRATION RATE: 20 BRPM | SYSTOLIC BLOOD PRESSURE: 156 MMHG | OXYGEN SATURATION: 98 % | DIASTOLIC BLOOD PRESSURE: 90 MMHG

## 2024-11-15 VITALS — HEART RATE: 110 BPM | DIASTOLIC BLOOD PRESSURE: 91 MMHG | SYSTOLIC BLOOD PRESSURE: 138 MMHG

## 2024-11-15 DIAGNOSIS — I10 ESSENTIAL (PRIMARY) HYPERTENSION: ICD-10-CM

## 2024-11-15 DIAGNOSIS — K52.9 NONINFECTIVE GASTROENTERITIS AND COLITIS, UNSPECIFIED: ICD-10-CM

## 2024-11-15 DIAGNOSIS — Z98.890 OTHER SPECIFIED POSTPROCEDURAL STATES: Chronic | ICD-10-CM

## 2024-11-15 DIAGNOSIS — Z98.41 CATARACT EXTRACTION STATUS, RIGHT EYE: Chronic | ICD-10-CM

## 2024-11-15 DIAGNOSIS — F41.9 ANXIETY DISORDER, UNSPECIFIED: ICD-10-CM

## 2024-11-15 DIAGNOSIS — Z98.41 CATARACT EXTRACTION STATUS, RIGHT EYE: ICD-10-CM

## 2024-11-15 DIAGNOSIS — C91.10 CHRONIC LYMPHOCYTIC LEUKEMIA OF B-CELL TYPE NOT HAVING ACHIEVED REMISSION: ICD-10-CM

## 2024-11-15 DIAGNOSIS — Z79.899 OTHER LONG TERM (CURRENT) DRUG THERAPY: ICD-10-CM

## 2024-11-15 DIAGNOSIS — Z96.652 PRESENCE OF LEFT ARTIFICIAL KNEE JOINT: ICD-10-CM

## 2024-11-15 DIAGNOSIS — Z90.89 ACQUIRED ABSENCE OF OTHER ORGANS: Chronic | ICD-10-CM

## 2024-11-15 DIAGNOSIS — Z87.891 PERSONAL HISTORY OF NICOTINE DEPENDENCE: ICD-10-CM

## 2024-11-15 PROCEDURE — 99284 EMERGENCY DEPT VISIT MOD MDM: CPT

## 2024-11-15 PROCEDURE — 99282 EMERGENCY DEPT VISIT SF MDM: CPT
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
## 2024-11-15 NOTE — ED ADULT NURSE NOTE - NSFALLUNIVINTERV_ED_ALL_ED
Bed/Stretcher in lowest position, wheels locked, appropriate side rails in place/Call bell, personal items and telephone in reach/Instruct patient to call for assistance before getting out of bed/chair/stretcher/Non-slip footwear applied when patient is off stretcher/[place] to call system/Physically safe environment - no spills, clutter or unnecessary equipment/Purposeful proactive rounding/Room/bathroom lighting operational, light cord in reach
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
## 2024-11-15 NOTE — ED PROVIDER NOTE - CPE EDP ENMT NORM
Medical Necessity Clause: This procedure was medically necessary because the lesions that were treated were: Render Note In Bullet Format When Appropriate: No Medical Necessity Information: It is in your best interest to select a reason for this procedure from the list below. All of these items fulfill various CMS LCD requirements except the new and changing color options. Post-Care Instructions: I reviewed with the patient in detail post-care instructions. Patient is to wear sunprotection, and avoid picking at any of the treated lesions. Pt may apply Vaseline to crusted or scabbing areas. Detail Level: Detailed Consent: The patient's consent was obtained including but not limited to risks of crusting, scabbing, blistering, scarring, darker or lighter pigmentary change, recurrence, incomplete removal and infection. normal...
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
## 2024-11-15 NOTE — ED PROVIDER NOTE - PATIENT PORTAL LINK FT
You can access the FollowMyHealth Patient Portal offered by [facility] by registering at the following website: [email or web address]. By joining [employer]’s FollowMyHealth portal, you will also be able to view your health information using other applications (apps) compatible with our system.

## 2024-11-15 NOTE — ED PROVIDER NOTE - MUSCULOSKELETAL, MLM
med eval Spine appears normal, range of motion is not limited, no muscle or joint tenderness. No midline spinal tenderness. 5/5 strength BUE, SILT symmetric
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
## 2024-11-15 NOTE — ED ADULT NURSE NOTE - SUICIDE SCREENING QUESTION 1
No Quality 130: Documentation Of Current Medications In The Medical Record: Current Medications Documented Detail Level: Detailed
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
## 2024-11-16 LAB — RENIN ACTIVITY, PLASMA: 0.55 NG/ML/HR

## 2024-11-18 DIAGNOSIS — Z96.641 PRESENCE OF RIGHT ARTIFICIAL HIP JOINT: ICD-10-CM

## 2024-11-18 DIAGNOSIS — Z85.72 PERSONAL HISTORY OF NON-HODGKIN LYMPHOMAS: ICD-10-CM

## 2024-11-18 DIAGNOSIS — M25.512 PAIN IN LEFT SHOULDER: ICD-10-CM

## 2024-11-18 DIAGNOSIS — I10 ESSENTIAL (PRIMARY) HYPERTENSION: ICD-10-CM

## 2024-11-18 DIAGNOSIS — G89.29 OTHER CHRONIC PAIN: ICD-10-CM

## 2024-11-18 DIAGNOSIS — M54.2 CERVICALGIA: ICD-10-CM

## 2024-11-18 DIAGNOSIS — Z85.6 PERSONAL HISTORY OF LEUKEMIA: ICD-10-CM

## 2024-11-18 DIAGNOSIS — Z87.891 PERSONAL HISTORY OF NICOTINE DEPENDENCE: ICD-10-CM
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
## 2024-11-21 ENCOUNTER — TRANSCRIPTION ENCOUNTER (OUTPATIENT)
Age: 69
End: 2024-11-21

## 2024-11-25 ENCOUNTER — TRANSCRIPTION ENCOUNTER (OUTPATIENT)
Age: 69
End: 2024-11-25

## 2024-12-17 ENCOUNTER — NON-APPOINTMENT (OUTPATIENT)
Age: 69
End: 2024-12-17

## 2024-12-17 ENCOUNTER — APPOINTMENT (OUTPATIENT)
Dept: GASTROENTEROLOGY | Facility: CLINIC | Age: 69
End: 2024-12-17
Payer: COMMERCIAL

## 2024-12-17 VITALS
BODY MASS INDEX: 24.15 KG/M2 | WEIGHT: 138 LBS | TEMPERATURE: 97.4 F | OXYGEN SATURATION: 99 % | DIASTOLIC BLOOD PRESSURE: 74 MMHG | HEIGHT: 63.5 IN | RESPIRATION RATE: 18 BRPM | HEART RATE: 94 BPM | SYSTOLIC BLOOD PRESSURE: 122 MMHG

## 2024-12-17 DIAGNOSIS — R93.89 ABNORMAL FINDINGS ON DIAGNOSTIC IMAGING OF OTHER SPECIFIED BODY STRUCTURES: ICD-10-CM

## 2024-12-17 PROCEDURE — 99205 OFFICE O/P NEW HI 60 MIN: CPT

## 2024-12-19 ENCOUNTER — TRANSCRIPTION ENCOUNTER (OUTPATIENT)
Age: 69
End: 2024-12-19

## 2024-12-20 ENCOUNTER — TRANSCRIPTION ENCOUNTER (OUTPATIENT)
Age: 69
End: 2024-12-20

## 2024-12-24 ENCOUNTER — TRANSCRIPTION ENCOUNTER (OUTPATIENT)
Age: 69
End: 2024-12-24

## 2024-12-26 ENCOUNTER — TRANSCRIPTION ENCOUNTER (OUTPATIENT)
Age: 69
End: 2024-12-26

## 2025-01-07 ENCOUNTER — TRANSCRIPTION ENCOUNTER (OUTPATIENT)
Age: 70
End: 2025-01-07

## 2025-01-08 ENCOUNTER — TRANSCRIPTION ENCOUNTER (OUTPATIENT)
Age: 70
End: 2025-01-08

## 2025-01-08 ENCOUNTER — OUTPATIENT (OUTPATIENT)
Dept: OUTPATIENT SERVICES | Facility: HOSPITAL | Age: 70
LOS: 1 days | Discharge: HOME | End: 2025-01-08
Payer: COMMERCIAL

## 2025-01-08 VITALS
TEMPERATURE: 99 F | WEIGHT: 138.01 LBS | RESPIRATION RATE: 16 BRPM | SYSTOLIC BLOOD PRESSURE: 138 MMHG | OXYGEN SATURATION: 98 % | DIASTOLIC BLOOD PRESSURE: 88 MMHG | HEART RATE: 98 BPM | HEIGHT: 63 IN

## 2025-01-08 DIAGNOSIS — Z98.890 OTHER SPECIFIED POSTPROCEDURAL STATES: Chronic | ICD-10-CM

## 2025-01-08 DIAGNOSIS — Z90.89 ACQUIRED ABSENCE OF OTHER ORGANS: Chronic | ICD-10-CM

## 2025-01-08 DIAGNOSIS — Z98.41 CATARACT EXTRACTION STATUS, RIGHT EYE: Chronic | ICD-10-CM

## 2025-01-08 PROCEDURE — 88305 TISSUE EXAM BY PATHOLOGIST: CPT

## 2025-01-08 PROCEDURE — 45380 COLONOSCOPY AND BIOPSY: CPT

## 2025-01-08 PROCEDURE — 88305 TISSUE EXAM BY PATHOLOGIST: CPT | Mod: 26

## 2025-01-13 ENCOUNTER — TRANSCRIPTION ENCOUNTER (OUTPATIENT)
Age: 70
End: 2025-01-13

## 2025-01-13 ENCOUNTER — APPOINTMENT (OUTPATIENT)
Dept: GASTROENTEROLOGY | Facility: CLINIC | Age: 70
End: 2025-01-13

## 2025-01-16 ENCOUNTER — TRANSCRIPTION ENCOUNTER (OUTPATIENT)
Age: 70
End: 2025-01-16

## 2025-02-04 ENCOUNTER — TRANSCRIPTION ENCOUNTER (OUTPATIENT)
Age: 70
End: 2025-02-04

## 2025-02-11 ENCOUNTER — APPOINTMENT (OUTPATIENT)
Dept: NEPHROLOGY | Facility: CLINIC | Age: 70
End: 2025-02-11
Payer: COMMERCIAL

## 2025-02-11 VITALS — DIASTOLIC BLOOD PRESSURE: 80 MMHG | HEART RATE: 80 BPM | SYSTOLIC BLOOD PRESSURE: 128 MMHG

## 2025-02-11 DIAGNOSIS — F41.9 ANXIETY DISORDER, UNSPECIFIED: ICD-10-CM

## 2025-02-11 DIAGNOSIS — R09.89 OTHER SPECIFIED SYMPTOMS AND SIGNS INVOLVING THE CIRCULATORY AND RESPIRATORY SYSTEMS: ICD-10-CM

## 2025-02-11 DIAGNOSIS — R73.03 PREDIABETES.: ICD-10-CM

## 2025-02-11 PROCEDURE — G2211 COMPLEX E/M VISIT ADD ON: CPT | Mod: NC

## 2025-02-11 PROCEDURE — 99214 OFFICE O/P EST MOD 30 MIN: CPT

## 2025-02-12 ENCOUNTER — TRANSCRIPTION ENCOUNTER (OUTPATIENT)
Age: 70
End: 2025-02-12

## 2025-03-05 ENCOUNTER — TRANSCRIPTION ENCOUNTER (OUTPATIENT)
Age: 70
End: 2025-03-05

## 2025-03-14 ENCOUNTER — APPOINTMENT (OUTPATIENT)
Dept: NEPHROLOGY | Facility: CLINIC | Age: 70
End: 2025-03-14
Payer: COMMERCIAL

## 2025-03-14 ENCOUNTER — NON-APPOINTMENT (OUTPATIENT)
Age: 70
End: 2025-03-14

## 2025-03-14 VITALS — SYSTOLIC BLOOD PRESSURE: 126 MMHG | DIASTOLIC BLOOD PRESSURE: 78 MMHG | HEART RATE: 78 BPM

## 2025-03-14 DIAGNOSIS — F41.9 ANXIETY DISORDER, UNSPECIFIED: ICD-10-CM

## 2025-03-14 DIAGNOSIS — I10 ESSENTIAL (PRIMARY) HYPERTENSION: ICD-10-CM

## 2025-03-14 DIAGNOSIS — R73.03 PREDIABETES.: ICD-10-CM

## 2025-03-14 PROCEDURE — G2211 COMPLEX E/M VISIT ADD ON: CPT | Mod: NC

## 2025-03-14 PROCEDURE — 99214 OFFICE O/P EST MOD 30 MIN: CPT

## 2025-03-25 ENCOUNTER — NON-APPOINTMENT (OUTPATIENT)
Age: 70
End: 2025-03-25

## 2025-03-26 ENCOUNTER — APPOINTMENT (OUTPATIENT)
Dept: OPHTHALMOLOGY | Facility: CLINIC | Age: 70
End: 2025-03-26
Payer: COMMERCIAL

## 2025-03-26 ENCOUNTER — NON-APPOINTMENT (OUTPATIENT)
Age: 70
End: 2025-03-26

## 2025-03-26 PROCEDURE — 92004 COMPRE OPH EXAM NEW PT 1/>: CPT

## 2025-03-26 PROCEDURE — 92134 CPTRZ OPH DX IMG PST SGM RTA: CPT

## 2025-04-04 ENCOUNTER — APPOINTMENT (OUTPATIENT)
Dept: ORTHOPEDIC SURGERY | Facility: CLINIC | Age: 70
End: 2025-04-04
Payer: COMMERCIAL

## 2025-04-04 VITALS
OXYGEN SATURATION: 97 % | HEART RATE: 73 BPM | WEIGHT: 140 LBS | HEIGHT: 62 IN | DIASTOLIC BLOOD PRESSURE: 79 MMHG | BODY MASS INDEX: 25.76 KG/M2 | SYSTOLIC BLOOD PRESSURE: 165 MMHG

## 2025-04-04 DIAGNOSIS — Z78.9 OTHER SPECIFIED HEALTH STATUS: ICD-10-CM

## 2025-04-04 DIAGNOSIS — M16.11 UNILATERAL PRIMARY OSTEOARTHRITIS, RIGHT HIP: ICD-10-CM

## 2025-04-04 DIAGNOSIS — Z56.0 UNEMPLOYMENT, UNSPECIFIED: ICD-10-CM

## 2025-04-04 PROCEDURE — 99205 OFFICE O/P NEW HI 60 MIN: CPT

## 2025-04-04 SDOH — ECONOMIC STABILITY - INCOME SECURITY: UNEMPLOYMENT, UNSPECIFIED: Z56.0

## 2025-04-07 DIAGNOSIS — G89.29 LOW BACK PAIN, UNSPECIFIED: ICD-10-CM

## 2025-04-07 DIAGNOSIS — M54.50 LOW BACK PAIN, UNSPECIFIED: ICD-10-CM

## 2025-04-07 PROBLEM — M16.11 PRIMARY OSTEOARTHRITIS OF RIGHT HIP: Status: ACTIVE | Noted: 2025-04-07

## 2025-04-07 RX ORDER — ROSUVASTATIN CALCIUM 5 MG/1
TABLET, FILM COATED ORAL
Refills: 0 | Status: ACTIVE | COMMUNITY

## 2025-04-10 ENCOUNTER — APPOINTMENT (OUTPATIENT)
Dept: ORTHOPEDIC SURGERY | Facility: CLINIC | Age: 70
End: 2025-04-10
Payer: COMMERCIAL

## 2025-04-10 VITALS
DIASTOLIC BLOOD PRESSURE: 80 MMHG | BODY MASS INDEX: 25.76 KG/M2 | HEART RATE: 76 BPM | TEMPERATURE: 98.1 F | WEIGHT: 140 LBS | SYSTOLIC BLOOD PRESSURE: 127 MMHG | OXYGEN SATURATION: 98 % | HEIGHT: 62 IN

## 2025-04-10 DIAGNOSIS — Z98.1 ARTHRODESIS STATUS: ICD-10-CM

## 2025-04-10 PROCEDURE — 99204 OFFICE O/P NEW MOD 45 MIN: CPT

## 2025-06-16 ENCOUNTER — APPOINTMENT (OUTPATIENT)
Dept: OPHTHALMOLOGY | Facility: CLINIC | Age: 70
End: 2025-06-16

## 2025-09-10 ENCOUNTER — APPOINTMENT (OUTPATIENT)
Dept: OPHTHALMOLOGY | Facility: CLINIC | Age: 70
End: 2025-09-10
Payer: COMMERCIAL

## 2025-09-10 ENCOUNTER — NON-APPOINTMENT (OUTPATIENT)
Age: 70
End: 2025-09-10

## 2025-09-10 PROCEDURE — 92012 INTRM OPH EXAM EST PATIENT: CPT

## 2025-09-15 ENCOUNTER — APPOINTMENT (OUTPATIENT)
Dept: OPHTHALMOLOGY | Facility: CLINIC | Age: 70
End: 2025-09-15

## 2025-09-15 ENCOUNTER — NON-APPOINTMENT (OUTPATIENT)
Age: 70
End: 2025-09-15

## (undated) DEVICE — PACK ANTERIOR SEGMENT

## (undated) DEVICE — MARKING PEN W RULER

## (undated) DEVICE — DRAPE MICROSCOPE KNOB COVER SMALL (2 PCS)

## (undated) DEVICE — SYR LUER LOK 10CC

## (undated) DEVICE — GOWN XXL

## (undated) DEVICE — CANNULA BD & CO SUBTENONS

## (undated) DEVICE — WARMING BLANKET LOWER ADULT

## (undated) DEVICE — FUSION PACK

## (undated) DEVICE — SOL IRR BAL SALT 500ML

## (undated) DEVICE — BLADE ALCON SURGICAL TUNNEL STRAIGHT MICRO FULL EDGE

## (undated) DEVICE — ELCTR ERASER BI-P BVL 45DEG 18G

## (undated) DEVICE — TRANSFORMER INTREPID I/A 0.3MM

## (undated) DEVICE — GLV 8 PROTEXIS (WHITE)

## (undated) DEVICE — KNIFE ALCON CRESCENT ANGLED BEVEL UP 2.3MM (PINK)

## (undated) DEVICE — SLEEVE LAMINER INFUSION 19G

## (undated) DEVICE — SUT SILK 6-0 18" TG140-8

## (undated) DEVICE — KNIFE ALCON STANDARD FULL HANDLE 15 DEG (PINK)

## (undated) DEVICE — FORCEP RADIAL JAW 4 W NDL 2.2MM 2.8MM 240CM ORANGE DISP

## (undated) DEVICE — SUT ETHILON 10-0 12" CS160-6

## (undated) DEVICE — KNIFE FULL HANDLE ANGLE 2.75MM